# Patient Record
Sex: FEMALE | Race: WHITE | Employment: OTHER | URBAN - METROPOLITAN AREA
[De-identification: names, ages, dates, MRNs, and addresses within clinical notes are randomized per-mention and may not be internally consistent; named-entity substitution may affect disease eponyms.]

---

## 2018-11-21 PROCEDURE — 99285 EMERGENCY DEPT VISIT HI MDM: CPT | Mod: 25

## 2018-11-22 ENCOUNTER — HOSPITAL ENCOUNTER (INPATIENT)
Facility: CLINIC | Age: 83
LOS: 1 days | Discharge: HOME | DRG: 316 | End: 2018-11-23
Attending: EMERGENCY MEDICINE | Admitting: INTERNAL MEDICINE
Payer: MEDICARE

## 2018-11-22 ENCOUNTER — APPOINTMENT (OUTPATIENT)
Dept: ULTRASOUND IMAGING | Facility: CLINIC | Age: 83
DRG: 316 | End: 2018-11-22
Attending: INTERNAL MEDICINE
Payer: MEDICARE

## 2018-11-22 ENCOUNTER — APPOINTMENT (OUTPATIENT)
Dept: CARDIOLOGY | Facility: CLINIC | Age: 83
DRG: 316 | End: 2018-11-22
Attending: INTERNAL MEDICINE
Payer: MEDICARE

## 2018-11-22 ENCOUNTER — APPOINTMENT (OUTPATIENT)
Dept: CT IMAGING | Facility: CLINIC | Age: 83
DRG: 316 | End: 2018-11-22
Attending: EMERGENCY MEDICINE
Payer: MEDICARE

## 2018-11-22 DIAGNOSIS — R00.1 BRADYCARDIA: ICD-10-CM

## 2018-11-22 DIAGNOSIS — R07.81 PLEURITIC CHEST PAIN: ICD-10-CM

## 2018-11-22 DIAGNOSIS — I30.0 ACUTE IDIOPATHIC PERICARDITIS: Primary | ICD-10-CM

## 2018-11-22 DIAGNOSIS — M54.2 NECK PAIN: ICD-10-CM

## 2018-11-22 PROBLEM — I31.9 PERICARDITIS: Status: ACTIVE | Noted: 2018-11-22

## 2018-11-22 LAB
ANION GAP SERPL CALCULATED.3IONS-SCNC: 7 MMOL/L (ref 3–14)
BASOPHILS # BLD AUTO: 0.1 10E9/L (ref 0–0.2)
BASOPHILS NFR BLD AUTO: 0.7 %
BUN SERPL-MCNC: 20 MG/DL (ref 7–30)
CALCIUM SERPL-MCNC: 8.7 MG/DL (ref 8.5–10.1)
CHLORIDE SERPL-SCNC: 102 MMOL/L (ref 94–109)
CO2 SERPL-SCNC: 28 MMOL/L (ref 20–32)
CREAT SERPL-MCNC: 1.02 MG/DL (ref 0.52–1.04)
CRP SERPL-MCNC: 5 MG/L (ref 0–8)
D DIMER PPP FEU-MCNC: 0.4 UG/ML FEU (ref 0–0.5)
DIFFERENTIAL METHOD BLD: NORMAL
EOSINOPHIL # BLD AUTO: 0.2 10E9/L (ref 0–0.7)
EOSINOPHIL NFR BLD AUTO: 2.3 %
ERYTHROCYTE [DISTWIDTH] IN BLOOD BY AUTOMATED COUNT: 12.9 % (ref 10–15)
ERYTHROCYTE [SEDIMENTATION RATE] IN BLOOD BY WESTERGREN METHOD: 10 MM/H (ref 0–30)
GFR SERPL CREATININE-BSD FRML MDRD: 51 ML/MIN/1.7M2
GLUCOSE BLDC GLUCOMTR-MCNC: 117 MG/DL (ref 70–99)
GLUCOSE SERPL-MCNC: 107 MG/DL (ref 70–99)
HCT VFR BLD AUTO: 39.1 % (ref 35–47)
HGB BLD-MCNC: 13.4 G/DL (ref 11.7–15.7)
IMM GRANULOCYTES # BLD: 0 10E9/L (ref 0–0.4)
IMM GRANULOCYTES NFR BLD: 0.3 %
INTERPRETATION ECG - MUSE: NORMAL
INTERPRETATION ECG - MUSE: NORMAL
LYMPHOCYTES # BLD AUTO: 2.4 10E9/L (ref 0.8–5.3)
LYMPHOCYTES NFR BLD AUTO: 25.7 %
MAGNESIUM SERPL-MCNC: 2.1 MG/DL (ref 1.6–2.3)
MCH RBC QN AUTO: 30.3 PG (ref 26.5–33)
MCHC RBC AUTO-ENTMCNC: 34.3 G/DL (ref 31.5–36.5)
MCV RBC AUTO: 89 FL (ref 78–100)
MONOCYTES # BLD AUTO: 0.6 10E9/L (ref 0–1.3)
MONOCYTES NFR BLD AUTO: 7 %
NEUTROPHILS # BLD AUTO: 5.9 10E9/L (ref 1.6–8.3)
NEUTROPHILS NFR BLD AUTO: 64 %
NRBC # BLD AUTO: 0 10*3/UL
NRBC BLD AUTO-RTO: 0 /100
PHOSPHATE SERPL-MCNC: 3.3 MG/DL (ref 2.5–4.5)
PLATELET # BLD AUTO: 218 10E9/L (ref 150–450)
POTASSIUM SERPL-SCNC: 4 MMOL/L (ref 3.4–5.3)
RBC # BLD AUTO: 4.42 10E12/L (ref 3.8–5.2)
SODIUM SERPL-SCNC: 137 MMOL/L (ref 133–144)
TROPONIN I SERPL-MCNC: <0.015 UG/L (ref 0–0.04)
WBC # BLD AUTO: 9.2 10E9/L (ref 4–11)

## 2018-11-22 PROCEDURE — 85379 FIBRIN DEGRADATION QUANT: CPT | Performed by: EMERGENCY MEDICINE

## 2018-11-22 PROCEDURE — 80048 BASIC METABOLIC PNL TOTAL CA: CPT | Performed by: EMERGENCY MEDICINE

## 2018-11-22 PROCEDURE — A9270 NON-COVERED ITEM OR SERVICE: HCPCS | Performed by: EMERGENCY MEDICINE

## 2018-11-22 PROCEDURE — 36416 COLLJ CAPILLARY BLOOD SPEC: CPT | Performed by: INTERNAL MEDICINE

## 2018-11-22 PROCEDURE — 40000264 ECHO COMPLETE WITH OPTISON

## 2018-11-22 PROCEDURE — 84484 ASSAY OF TROPONIN QUANT: CPT | Performed by: EMERGENCY MEDICINE

## 2018-11-22 PROCEDURE — 99222 1ST HOSP IP/OBS MODERATE 55: CPT | Mod: 25 | Performed by: INTERNAL MEDICINE

## 2018-11-22 PROCEDURE — 84484 ASSAY OF TROPONIN QUANT: CPT | Performed by: INTERNAL MEDICINE

## 2018-11-22 PROCEDURE — 25000128 H RX IP 250 OP 636: Performed by: INTERNAL MEDICINE

## 2018-11-22 PROCEDURE — 99207 ZZC APP CREDIT; MD BILLING SHARED VISIT: CPT | Performed by: INTERNAL MEDICINE

## 2018-11-22 PROCEDURE — 93306 TTE W/DOPPLER COMPLETE: CPT | Mod: 26 | Performed by: INTERNAL MEDICINE

## 2018-11-22 PROCEDURE — A9270 NON-COVERED ITEM OR SERVICE: HCPCS | Performed by: INTERNAL MEDICINE

## 2018-11-22 PROCEDURE — 93005 ELECTROCARDIOGRAM TRACING: CPT | Mod: 76

## 2018-11-22 PROCEDURE — 21000000 ZZH R&B IMCU HEART CARE

## 2018-11-22 PROCEDURE — 86617 LYME DISEASE ANTIBODY: CPT | Performed by: INTERNAL MEDICINE

## 2018-11-22 PROCEDURE — 25500064 ZZH RX 255 OP 636: Performed by: INTERNAL MEDICINE

## 2018-11-22 PROCEDURE — 74177 CT ABD & PELVIS W/CONTRAST: CPT

## 2018-11-22 PROCEDURE — 36415 COLL VENOUS BLD VENIPUNCTURE: CPT | Performed by: INTERNAL MEDICINE

## 2018-11-22 PROCEDURE — 83735 ASSAY OF MAGNESIUM: CPT | Performed by: INTERNAL MEDICINE

## 2018-11-22 PROCEDURE — 93970 EXTREMITY STUDY: CPT

## 2018-11-22 PROCEDURE — 86140 C-REACTIVE PROTEIN: CPT | Performed by: EMERGENCY MEDICINE

## 2018-11-22 PROCEDURE — 96361 HYDRATE IV INFUSION ADD-ON: CPT

## 2018-11-22 PROCEDURE — 25000132 ZZH RX MED GY IP 250 OP 250 PS 637: Performed by: INTERNAL MEDICINE

## 2018-11-22 PROCEDURE — 00000146 ZZHCL STATISTIC GLUCOSE BY METER IP

## 2018-11-22 PROCEDURE — 25000128 H RX IP 250 OP 636: Performed by: EMERGENCY MEDICINE

## 2018-11-22 PROCEDURE — 85652 RBC SED RATE AUTOMATED: CPT | Performed by: EMERGENCY MEDICINE

## 2018-11-22 PROCEDURE — 85025 COMPLETE CBC W/AUTO DIFF WBC: CPT | Performed by: EMERGENCY MEDICINE

## 2018-11-22 PROCEDURE — 86618 LYME DISEASE ANTIBODY: CPT | Performed by: INTERNAL MEDICINE

## 2018-11-22 PROCEDURE — 93005 ELECTROCARDIOGRAM TRACING: CPT

## 2018-11-22 PROCEDURE — 25000125 ZZHC RX 250: Performed by: EMERGENCY MEDICINE

## 2018-11-22 PROCEDURE — 96374 THER/PROPH/DIAG INJ IV PUSH: CPT

## 2018-11-22 PROCEDURE — 84100 ASSAY OF PHOSPHORUS: CPT | Performed by: INTERNAL MEDICINE

## 2018-11-22 PROCEDURE — 96375 TX/PRO/DX INJ NEW DRUG ADDON: CPT

## 2018-11-22 PROCEDURE — 25000132 ZZH RX MED GY IP 250 OP 250 PS 637: Performed by: EMERGENCY MEDICINE

## 2018-11-22 PROCEDURE — 99223 1ST HOSP IP/OBS HIGH 75: CPT | Mod: AI | Performed by: INTERNAL MEDICINE

## 2018-11-22 RX ORDER — AMOXICILLIN 250 MG
1 CAPSULE ORAL 2 TIMES DAILY PRN
Status: DISCONTINUED | OUTPATIENT
Start: 2018-11-22 | End: 2018-11-23 | Stop reason: HOSPADM

## 2018-11-22 RX ORDER — BISACODYL 10 MG
10 SUPPOSITORY, RECTAL RECTAL DAILY PRN
Status: DISCONTINUED | OUTPATIENT
Start: 2018-11-22 | End: 2018-11-23 | Stop reason: HOSPADM

## 2018-11-22 RX ORDER — ONDANSETRON 4 MG/1
4 TABLET, ORALLY DISINTEGRATING ORAL EVERY 6 HOURS PRN
Status: DISCONTINUED | OUTPATIENT
Start: 2018-11-22 | End: 2018-11-23 | Stop reason: HOSPADM

## 2018-11-22 RX ORDER — MAGNESIUM SULFATE HEPTAHYDRATE 40 MG/ML
4 INJECTION, SOLUTION INTRAVENOUS EVERY 4 HOURS PRN
Status: DISCONTINUED | OUTPATIENT
Start: 2018-11-22 | End: 2018-11-22

## 2018-11-22 RX ORDER — MAGNESIUM SULFATE HEPTAHYDRATE 40 MG/ML
4 INJECTION, SOLUTION INTRAVENOUS EVERY 4 HOURS PRN
Status: DISCONTINUED | OUTPATIENT
Start: 2018-11-22 | End: 2018-11-23 | Stop reason: HOSPADM

## 2018-11-22 RX ORDER — POTASSIUM CHLORIDE 29.8 MG/ML
20 INJECTION INTRAVENOUS
Status: DISCONTINUED | OUTPATIENT
Start: 2018-11-22 | End: 2018-11-23 | Stop reason: HOSPADM

## 2018-11-22 RX ORDER — HYDRALAZINE HYDROCHLORIDE 20 MG/ML
10 INJECTION INTRAMUSCULAR; INTRAVENOUS EVERY 4 HOURS PRN
Status: DISCONTINUED | OUTPATIENT
Start: 2018-11-22 | End: 2018-11-23 | Stop reason: HOSPADM

## 2018-11-22 RX ORDER — ATROPINE SULFATE 0.1 MG/ML
INJECTION INTRAVENOUS
Status: DISCONTINUED
Start: 2018-11-22 | End: 2018-11-22 | Stop reason: HOSPADM

## 2018-11-22 RX ORDER — POTASSIUM CHLORIDE 29.8 MG/ML
20 INJECTION INTRAVENOUS
Status: DISCONTINUED | OUTPATIENT
Start: 2018-11-22 | End: 2018-11-22

## 2018-11-22 RX ORDER — ACETAMINOPHEN 325 MG/1
650 TABLET ORAL EVERY 4 HOURS PRN
COMMUNITY

## 2018-11-22 RX ORDER — PROCHLORPERAZINE MALEATE 5 MG
5 TABLET ORAL EVERY 6 HOURS PRN
Status: DISCONTINUED | OUTPATIENT
Start: 2018-11-22 | End: 2018-11-23 | Stop reason: HOSPADM

## 2018-11-22 RX ORDER — OXYCODONE HYDROCHLORIDE 5 MG/1
5-10 TABLET ORAL
Status: DISCONTINUED | OUTPATIENT
Start: 2018-11-22 | End: 2018-11-23 | Stop reason: HOSPADM

## 2018-11-22 RX ORDER — AMOXICILLIN 250 MG
1 CAPSULE ORAL 2 TIMES DAILY
Status: DISCONTINUED | OUTPATIENT
Start: 2018-11-22 | End: 2018-11-23 | Stop reason: HOSPADM

## 2018-11-22 RX ORDER — POLYETHYLENE GLYCOL 3350 17 G/17G
17 POWDER, FOR SOLUTION ORAL DAILY PRN
Status: DISCONTINUED | OUTPATIENT
Start: 2018-11-22 | End: 2018-11-23 | Stop reason: HOSPADM

## 2018-11-22 RX ORDER — AMLODIPINE BESYLATE 5 MG/1
5 TABLET ORAL DAILY
Status: ON HOLD | COMMUNITY
End: 2018-11-23

## 2018-11-22 RX ORDER — HYDROMORPHONE HYDROCHLORIDE 1 MG/ML
0.5 INJECTION, SOLUTION INTRAMUSCULAR; INTRAVENOUS; SUBCUTANEOUS ONCE
Status: COMPLETED | OUTPATIENT
Start: 2018-11-22 | End: 2018-11-22

## 2018-11-22 RX ORDER — POTASSIUM CHLORIDE 1500 MG/1
20-40 TABLET, EXTENDED RELEASE ORAL
Status: DISCONTINUED | OUTPATIENT
Start: 2018-11-22 | End: 2018-11-23 | Stop reason: HOSPADM

## 2018-11-22 RX ORDER — AMOXICILLIN 250 MG
2 CAPSULE ORAL 2 TIMES DAILY
Status: DISCONTINUED | OUTPATIENT
Start: 2018-11-22 | End: 2018-11-23 | Stop reason: HOSPADM

## 2018-11-22 RX ORDER — ATENOLOL 25 MG/1
25 TABLET ORAL 2 TIMES DAILY
Status: ON HOLD | COMMUNITY
End: 2018-11-23

## 2018-11-22 RX ORDER — ONDANSETRON 2 MG/ML
INJECTION INTRAMUSCULAR; INTRAVENOUS
Status: DISCONTINUED
Start: 2018-11-22 | End: 2018-11-22 | Stop reason: HOSPADM

## 2018-11-22 RX ORDER — PROCHLORPERAZINE 25 MG
12.5 SUPPOSITORY, RECTAL RECTAL EVERY 12 HOURS PRN
Status: DISCONTINUED | OUTPATIENT
Start: 2018-11-22 | End: 2018-11-23 | Stop reason: HOSPADM

## 2018-11-22 RX ORDER — COLCHICINE 0.6 MG/1
0.6 TABLET ORAL 2 TIMES DAILY
Status: COMPLETED | OUTPATIENT
Start: 2018-11-22 | End: 2018-11-23

## 2018-11-22 RX ORDER — NITROGLYCERIN 0.4 MG/1
0.4 TABLET SUBLINGUAL EVERY 5 MIN PRN
Status: DISCONTINUED | OUTPATIENT
Start: 2018-11-22 | End: 2018-11-23 | Stop reason: HOSPADM

## 2018-11-22 RX ORDER — ERGOCALCIFEROL (VITAMIN D2) 10 MCG
1 TABLET ORAL DAILY
COMMUNITY

## 2018-11-22 RX ORDER — ACETAMINOPHEN 325 MG/1
650 TABLET ORAL EVERY 4 HOURS PRN
Status: DISCONTINUED | OUTPATIENT
Start: 2018-11-22 | End: 2018-11-23 | Stop reason: HOSPADM

## 2018-11-22 RX ORDER — LIDOCAINE 40 MG/G
CREAM TOPICAL
Status: DISCONTINUED | OUTPATIENT
Start: 2018-11-22 | End: 2018-11-22

## 2018-11-22 RX ORDER — POTASSIUM CHLORIDE 1500 MG/1
20-40 TABLET, EXTENDED RELEASE ORAL
Status: DISCONTINUED | OUTPATIENT
Start: 2018-11-22 | End: 2018-11-22

## 2018-11-22 RX ORDER — COLCHICINE 0.6 MG/1
0.6 TABLET ORAL DAILY
Status: DISCONTINUED | OUTPATIENT
Start: 2018-11-24 | End: 2018-11-23 | Stop reason: HOSPADM

## 2018-11-22 RX ORDER — IOPAMIDOL 755 MG/ML
80 INJECTION, SOLUTION INTRAVASCULAR ONCE
Status: COMPLETED | OUTPATIENT
Start: 2018-11-22 | End: 2018-11-22

## 2018-11-22 RX ORDER — NALOXONE HYDROCHLORIDE 0.4 MG/ML
.1-.4 INJECTION, SOLUTION INTRAMUSCULAR; INTRAVENOUS; SUBCUTANEOUS
Status: DISCONTINUED | OUTPATIENT
Start: 2018-11-22 | End: 2018-11-23 | Stop reason: HOSPADM

## 2018-11-22 RX ORDER — POTASSIUM CHLORIDE 7.45 MG/ML
10 INJECTION INTRAVENOUS
Status: DISCONTINUED | OUTPATIENT
Start: 2018-11-22 | End: 2018-11-23 | Stop reason: HOSPADM

## 2018-11-22 RX ORDER — POTASSIUM CHLORIDE 7.45 MG/ML
10 INJECTION INTRAVENOUS
Status: DISCONTINUED | OUTPATIENT
Start: 2018-11-22 | End: 2018-11-22

## 2018-11-22 RX ORDER — LIDOCAINE 40 MG/G
CREAM TOPICAL
Status: DISCONTINUED | OUTPATIENT
Start: 2018-11-22 | End: 2018-11-23 | Stop reason: HOSPADM

## 2018-11-22 RX ORDER — HYDROMORPHONE HYDROCHLORIDE 1 MG/ML
0.2 INJECTION, SOLUTION INTRAMUSCULAR; INTRAVENOUS; SUBCUTANEOUS
Status: DISCONTINUED | OUTPATIENT
Start: 2018-11-22 | End: 2018-11-23 | Stop reason: HOSPADM

## 2018-11-22 RX ORDER — POTASSIUM CHLORIDE 1.5 G/1.58G
20-40 POWDER, FOR SOLUTION ORAL
Status: DISCONTINUED | OUTPATIENT
Start: 2018-11-22 | End: 2018-11-23 | Stop reason: HOSPADM

## 2018-11-22 RX ORDER — AMOXICILLIN 250 MG
2 CAPSULE ORAL 2 TIMES DAILY PRN
Status: DISCONTINUED | OUTPATIENT
Start: 2018-11-22 | End: 2018-11-23 | Stop reason: HOSPADM

## 2018-11-22 RX ORDER — SODIUM CHLORIDE 9 MG/ML
INJECTION, SOLUTION INTRAVENOUS CONTINUOUS
Status: DISCONTINUED | OUTPATIENT
Start: 2018-11-22 | End: 2018-11-23

## 2018-11-22 RX ORDER — POTASSIUM CL/LIDO/0.9 % NACL 10MEQ/0.1L
10 INTRAVENOUS SOLUTION, PIGGYBACK (ML) INTRAVENOUS
Status: DISCONTINUED | OUTPATIENT
Start: 2018-11-22 | End: 2018-11-22

## 2018-11-22 RX ORDER — ATROPINE SULFATE 0.1 MG/ML
0.5 INJECTION INTRAVENOUS ONCE
Status: COMPLETED | OUTPATIENT
Start: 2018-11-22 | End: 2018-11-22

## 2018-11-22 RX ORDER — ONDANSETRON 2 MG/ML
4 INJECTION INTRAMUSCULAR; INTRAVENOUS EVERY 30 MIN PRN
Status: DISCONTINUED | OUTPATIENT
Start: 2018-11-22 | End: 2018-11-22

## 2018-11-22 RX ORDER — POTASSIUM CL/LIDO/0.9 % NACL 10MEQ/0.1L
10 INTRAVENOUS SOLUTION, PIGGYBACK (ML) INTRAVENOUS
Status: DISCONTINUED | OUTPATIENT
Start: 2018-11-22 | End: 2018-11-23 | Stop reason: HOSPADM

## 2018-11-22 RX ORDER — ONDANSETRON 2 MG/ML
4 INJECTION INTRAMUSCULAR; INTRAVENOUS EVERY 6 HOURS PRN
Status: DISCONTINUED | OUTPATIENT
Start: 2018-11-22 | End: 2018-11-23 | Stop reason: HOSPADM

## 2018-11-22 RX ORDER — POTASSIUM CHLORIDE 1.5 G/1.58G
20-40 POWDER, FOR SOLUTION ORAL
Status: DISCONTINUED | OUTPATIENT
Start: 2018-11-22 | End: 2018-11-22

## 2018-11-22 RX ORDER — OXYBUTYNIN CHLORIDE 5 MG/1
5 TABLET ORAL 2 TIMES DAILY PRN
COMMUNITY

## 2018-11-22 RX ADMIN — ASPIRIN 650 MG: 325 TABLET, DELAYED RELEASE ORAL at 08:24

## 2018-11-22 RX ADMIN — SENNOSIDES AND DOCUSATE SODIUM 1 TABLET: 8.6; 5 TABLET ORAL at 08:24

## 2018-11-22 RX ADMIN — SODIUM CHLORIDE: 9 INJECTION, SOLUTION INTRAVENOUS at 04:45

## 2018-11-22 RX ADMIN — ASPIRIN 650 MG: 325 TABLET, DELAYED RELEASE ORAL at 16:44

## 2018-11-22 RX ADMIN — HUMAN ALBUMIN MICROSPHERES AND PERFLUTREN 9 ML: 10; .22 INJECTION, SOLUTION INTRAVENOUS at 16:38

## 2018-11-22 RX ADMIN — ACETAMINOPHEN 650 MG: 325 TABLET, FILM COATED ORAL at 10:49

## 2018-11-22 RX ADMIN — SODIUM CHLORIDE 1000 ML: 9 INJECTION, SOLUTION INTRAVENOUS at 00:59

## 2018-11-22 RX ADMIN — ASPIRIN 650 MG: 325 TABLET, DELAYED RELEASE ORAL at 22:32

## 2018-11-22 RX ADMIN — LIDOCAINE HYDROCHLORIDE 30 ML: 20 SOLUTION ORAL; TOPICAL at 00:12

## 2018-11-22 RX ADMIN — ACETAMINOPHEN 650 MG: 325 TABLET, FILM COATED ORAL at 18:43

## 2018-11-22 RX ADMIN — ONDANSETRON 4 MG: 2 INJECTION INTRAMUSCULAR; INTRAVENOUS at 01:00

## 2018-11-22 RX ADMIN — Medication 0.5 MG: at 02:04

## 2018-11-22 RX ADMIN — SODIUM CHLORIDE: 9 INJECTION, SOLUTION INTRAVENOUS at 15:20

## 2018-11-22 RX ADMIN — IOPAMIDOL 80 ML: 755 INJECTION, SOLUTION INTRAVENOUS at 01:29

## 2018-11-22 RX ADMIN — ATROPINE SULFATE 0.5 MG: 0.1 INJECTION, SOLUTION INTRAVENOUS at 01:00

## 2018-11-22 RX ADMIN — COLCHICINE 0.6 MG: 0.6 TABLET, FILM COATED ORAL at 20:50

## 2018-11-22 RX ADMIN — Medication 0.2 MG: at 04:59

## 2018-11-22 ASSESSMENT — ACTIVITIES OF DAILY LIVING (ADL)
ADLS_ACUITY_SCORE: 9

## 2018-11-22 ASSESSMENT — ENCOUNTER SYMPTOMS: NECK PAIN: 1

## 2018-11-22 ASSESSMENT — PAIN DESCRIPTION - DESCRIPTORS: DESCRIPTORS: SORE

## 2018-11-22 NOTE — IP AVS SNAPSHOT
MRN:[id number]                      After Visit Summary   11/22/2018    Collette Sanchez    MRN: [id number]           Thank you!     Thank you for choosing [facility] for your care. Our goal is always to provide you with excellent care. Hearing back from our patients is one way we can continue to improve our services. Please take a few minutes to complete the written survey that you may receive in the mail after you visit with us. Thank you!        Patient Information     Date Of Birth          1/10/1931        Designated Caregiver       Most Recent Value    Caregiver    Will someone help with your care after discharge? no      About your hospital stay     You were admitted on:  November 22, 2018 You last received care in the:  [facility]    You were discharged on:  November 23, 2018        Reason for your hospital stay       Acute pericarditis                  Who to Call     For medical emergencies, please call 911.  For non-urgent questions about your medical care, please call your primary care provider or clinic, [phone]          Attending Provider     Provider Specialty    Trierweiler, Chad A, MD Emergency Medicine    Swift, Dmtiriy Stark MD Internal Medicine       Primary Care Provider Office Phone # Fax #    Carmen Rivas [phone]      After Care Instructions     Activity       Your activity upon discharge: activity as tolerated            Diet       Follow this diet upon discharge: Orders Placed This Encounter      Room Service      Regular Diet Adult            Monitor and record       blood pressure daily and readings to PCP/Cardiology for any medication adjustment   pulse daily                  Follow-up Appointments     Follow-up and recommended labs and tests        Follow up with primary care provider, Carmen Rivas, within 7 days for hospital follow- up.  The following labs/tests are recommended: CBC/BMP.  Abdominal ultrasound through  "PCP to evaluate for  Questionable nodularity in the liver   Follow-up with Cardiology [facility] as scheduled                  Additional Services     Follow-Up with Cardiac Advanced Practice Provider                 Further instructions from your care team         Follow-up in 1 month with [facility] in cardiology clinic continue aspirin and colchicine in the meantime.      Pending Results     Date and Time Order Name Status Description    2018 0545 Lyme Conf IgG and IgM by Immunoblot In process             Statement of Approval     Ordered          18 1310  I have reviewed and agree with all the recommendations and orders detailed in this document.  EFFECTIVE NOW     Approved and electronically signed by:  Anne Cee MD             Admission Information     Date & Time Provider Department Dept. Phone    2018 Swift, Dmitriy Stark MD [facility] [phone]      Your Vitals Were     Blood Pressure Pulse Temperature Respirations Height Weight    138/72 (BP Location: Left arm) 75 98.5  F (36.9  C) (Oral) 18 1.549 m (5' 1\") 71.8 kg (158 lb 6.4 oz)    Pulse Oximetry BMI (Body Mass Index)                94% 29.93 kg/m2          [employer] Information     [employer] lets you send messages to your doctor, view your test results, renew your prescriptions, schedule appointments and more. To sign up, go to [email or web address] . Click on \"Log in\" on the left side of the screen, which will take you to the Welcome page. Then click on \"Sign up Now\" on the right side of the page.     You will be asked to enter the access code listed below, as well as some personal information. Please follow the directions to create your username and password.     Your access code is: [email or web address]  Expires: 2019  1:10 PM     Your access code will  in 90 days. If you need help or a new code, please call your [facility] or [phone].        Care EveryWhere ID     This is your Care EveryWhere ID. This " could be used by other organizations to access your [facility] medical records  [id number]        Equal Access to Services     VILMA LOCKHART : Hadii susan Ureña, xi mae, qamigueltj kacristiraquel luna, eduardo yeungabbynavi kurtz. So [facility] [phone].    ATENCIÓN: Si habla español, tiene a ford disposición servicios gratuitos de asistencia lingüística. Llame al [phone].    We comply with applicable federal civil rights laws and [place] laws. We do not discriminate on the basis of race, color, national origin, age, disability, sex, sexual orientation, or gender identity.               Review of your medicines      START taking        Dose / Directions    aspirin 325 MG EC tablet   Commonly known as:  ASA        Dose:  650 mg   Take 2 tablets (650 mg) by mouth 3 times daily   Quantity:  180 tablet   Refills:  0       colchicine 0.6 MG tablet   Commonly known as:  COLCYRS        Dose:  0.6 mg   Start taking on:  11/24/2018   Take 1 tablet (0.6 mg) by mouth daily   Quantity:  30 tablet   Refills:  0       omeprazole 40 MG capsule   Commonly known as:  priLOSEC        Dose:  40 mg   Start taking on:  11/24/2018   Take 1 capsule (40 mg) by mouth every morning (before breakfast)   Quantity:  30 capsule   Refills:  0         CONTINUE these medicines which have NOT CHANGED        Dose / Directions    acetaminophen 325 MG tablet   Commonly known as:  TYLENOL        Dose:  650 mg   Take 650 mg by mouth every 4 hours as needed for mild pain   Refills:  0       calcium carbonate 600 mg (elemental) 1500 (600 Ca) MG tablet   Commonly known as:  OS-BRIANA        Dose:  1200 mg   Take 1,200 mg by mouth daily   Refills:  0       oxybutynin 5 MG tablet   Commonly known as:  DITROPAN        Dose:  5 mg   Take 5 mg by mouth 2 times daily as needed for bladder spasms   Refills:  0       Vitamin D (Cholecalciferol) 400 units Tabs        Dose:  1 tablet   Take 1 tablet by mouth daily   Refills:  0          STOP taking     amLODIPine 5 MG tablet   Commonly known as:  NORVASC           atenolol 25 MG tablet   Commonly known as:  TENORMIN                Where to get your medicines      These medications were sent to [facility] [place], [place] - [address]  [address] [place] [address]     Phone:  [phone]     aspirin 325 MG EC tablet    colchicine 0.6 MG tablet    omeprazole 40 MG capsule                Protect others around you: Learn how to safely use, store and throw away your medicines at www.disposemymeds.org.             Medication List: This is a list of all your medications and when to take them. Check marks below indicate your daily home schedule. Keep this list as a reference.      Medications           Morning Afternoon Evening Bedtime As Needed    acetaminophen 325 MG tablet   Commonly known as:  TYLENOL   Take 650 mg by mouth every 4 hours as needed for mild pain   Last time this was given:  650 mg on 11/22/2018  6:43 PM                                aspirin 325 MG EC tablet   Commonly known as:  ASA   Take 2 tablets (650 mg) by mouth 3 times daily   Last time this was given:  650 mg on 11/23/2018  8:55 AM                                calcium carbonate 600 mg (elemental) 1500 (600 Ca) MG tablet   Commonly known as:  OS-BRIANA   Take 1,200 mg by mouth daily                                colchicine 0.6 MG tablet   Commonly known as:  COLCYRS   Take 1 tablet (0.6 mg) by mouth daily   Start taking on:  11/24/2018   Last time this was given:  0.6 mg on 11/23/2018  8:55 AM                                omeprazole 40 MG capsule   Commonly known as:  priLOSEC   Take 1 capsule (40 mg) by mouth every morning (before breakfast)   Start taking on:  11/24/2018   Last time this was given:  20 mg on 11/23/2018  8:54 AM                                oxybutynin 5 MG tablet   Commonly known as:  DITROPAN   Take 5 mg by mouth 2 times daily as needed for bladder spasms                                 Vitamin D (Cholecalciferol) 400 units Tabs   Take 1 tablet by mouth daily                                          More Information        Pericarditis    Pericarditis is inflammation of the pericardium, the thin sac (membrane) that surrounds the heart.  The pericardium holds the heart in place and helps it work properly. There is a small amount of fluid between the inner and outer layers of the pericardium. This fluid keeps the layers from rubbing as the heart moves to pump blood. Pericarditis may last for 2 to 6 weeks.  Home care  Follow these guidelines when caring for yourself at home:    It s important to rest until you feel better. Don t do any strenuous activity during this time.    You may use ibuprofen or naproxen to control pain, unless another medicine was prescribed. If you have chronic liver or kidney disease, talk with your healthcare provider before using these medicines. Also talk with your provider if you ve had a stomach ulcer or gastrointestinal bleeding. Acetaminophen may not help this type of pain as much as ibuprofen or naproxen.  Follow-up care  Follow up with your healthcare provider, or as advised. Also call your provider if your symptoms don t get better in 1 week, or if they last more than 2 weeks.  When to seek medical advice  Call your healthcare provider right away if any of these occur:    A change in the type of pain. This means if it feels different, becomes more severe, lasts longer, or begins to spread into your shoulder, arm, neck, jaw, or back.    Shortness of breath or more pain with breathing    Weakness, dizziness, or fainting    Cough with dark-colored phlegm (sputum) or blood    Fever of 100.4 F (38 C) or higher, or as directed by your healthcare provider    Swelling in a leg    Rapid pulse rate that does not go away with rest  Date Last Reviewed: 6/1/2016 2000-2018 [employer] [address] [facility], [place] [address]. All rights reserved.  This information is not intended as a substitute for professional medical care. Always follow your healthcare professional's instructions.                Patient Education    Colchicine Oral capsule    Colchicine Oral tablet    Colchicine Solution for injection  Colchicine Oral tablet  What is this medicine?  COLCHICINE (KOL chi seen) is for joint pain and swelling due to attacks of acute gouty arthritis. The medicine is also used to treat familial Mediterranean fever.  This medicine may be used for other purposes; ask your health care provider or pharmacist if you have questions.  What should I tell my health care provider before I take this medicine?  They need to know if you have any of these conditions:    anemia    blood disorders like leukemia or lymphoma    heart disease    immune system problems    intestinal disease    kidney disease    liver disease    muscle pain or weakness    take other medicines    stomach problems    an unusual or allergic reaction to colchicine, other medicines, lactose, foods, dyes, or preservatives    pregnant or trying to get pregnant    breast-feeding  How should I use this medicine?  Take this medicine by mouth with a full glass of water. Follow the directions on the prescription label. You can take it with or without food. If it upsets your stomach, take it with food. Take your medicine at regular intervals. Do not take your medicine more often than directed.  A special MedGuide will be given to you by the pharmacist with each prescription and refill. Be sure to read this information carefully each time.  Talk to your pediatrician regarding the use of this medicine in children. While this drug may be prescribed for children as young as 4 years old for selected conditions, precautions do apply.  Patients over 65 years old may have a stronger reaction and need a smaller dose.  Overdosage: If you think you have taken too much of this medicine contact a poison control center or  emergency room at once.  NOTE: This medicine is only for you. Do not share this medicine with others.  What if I miss a dose?  If you miss a dose, take it as soon as you can. If it is almost time for your next dose, take only that dose. Do not take double or extra doses.  What may interact with this medicine?  Do not take this medicine with any of the following medications:    certain medicines for fungal infections like itraconazole  This medicine may also interact with the following medications:    alcohol    certain medicines for cholesterol like atorvastatin    certain medicines for coughs and colds    certain medicines to help you breathe better    cyclosporine    digoxin    epinephrine    grapefruit or grapefruit juice    methenamine    other medicines for fungal infection    sodium bicarbonate    some antibiotics like clarithromycin, erythromycin, and telithromycin    some medicines for an irregular heartbeat or other heart problems    some medicines for cancer, like lapatinib and tamoxifen    some medicines for HIV  This list may not describe all possible interactions. Give your health care provider a list of all the medicines, herbs, non-prescription drugs, or dietary supplements you use. Also tell them if you smoke, drink alcohol, or use illegal drugs. Some items may interact with your medicine.  What should I watch for while using this medicine?  Visit your doctor or health care professional for regular checks on your progress. You may need periodic blood checks.  Alcohol can increase the chance of getting stomach problems and gout attacks. Do not drink alcohol.  What side effects may I notice from receiving this medicine?  Side effects that you should report to your doctor or health care professional as soon as possible:    allergic reactions like skin rash, itching or hives, swelling of the face, lips, or tongue    fever, chills, or sore throat    muscle tenderness, pain, or weakness    numbness or  tingling in hands or feet    unusual bleeding or bruising    unusually weak or tired    vomiting  Side effects that usually do not require medical attention (report to your doctor or health care professional if they continue or are bothersome):    diarrhea    hair loss    loss of appetite    stomach pain or nausea  This list may not describe all possible side effects. Call your doctor for medical advice about side effects. You may report side effects to FDA at 8-595-FLL-0147.  Where should I keep my medicine?  Keep out of the reach of children.  Store at room temperature between 15 and 30 degrees C (59 and 86 degrees F). Keep container tightly closed. Protect from light. Throw away any unused medicine after the expiration date.  NOTE:This sheet is a summary. It may not cover all possible information. If you have questions about this medicine, talk to your doctor, pharmacist, or health care provider. Copyright  2016 [employer]                Omeprazole tablets (OTC)  Brand Name: Prilosec OTC  What is this medicine?  OMEPRAZOLE (oh ME pray zol) prevents the production of acid in the stomach. It is used to treat the symptoms of heartburn. You can buy this medicine without a prescription. This product is not for long-term use, unless otherwise directed by your doctor or health care professional.  How should I use this medicine?  Take this medicine by mouth. Follow the directions on the product label. If you are taking this medicine without a prescription, take one tablet every day. Do not use for longer than 14 days or repeat a course of treatment more often than every 4 months unless directed by a doctor or healthcare professional. Take your dose at regular intervals every 24 hours. Swallow the tablet whole with a drink of water. Do not crush, break or chew. This medicine works best if taken on an empty stomach 30 minutes before breakfast. If you are using this medicine with the prescription of your doctor or  healthcare professional, follow the directions you were given. Do not take your medicine more often than directed.  Talk to your pediatrician regarding the use of this medicine in children. Special care may be needed.  What side effects may I notice from receiving this medicine?  Side effects that you should report to your doctor or health care professional as soon as possible:    allergic reactions like skin rash, itching or hives, swelling of the face, lips, or tongue    bone, muscle or joint pain    breathing problems    chest pain or chest tightness    dark yellow or brown urine    diarrhea    dizziness    fast, irregular heartbeat    feeling faint or lightheaded    fever or sore throat    muscle spasm    palpitations    rash on cheeks or arms that gets worse in the sun    redness, blistering, peeling or loosening of the skin, including inside the mouth    seizures    stomach polyps    tremors    unusual bleeding or bruising    unusually weak or tired    yellowing of the eyes or skin  Side effects that usually do not require medical attention (report to your doctor or health care professional if they continue or are bothersome):    constipation    dry mouth    headache    loose stools    nausea  What may interact with this medicine?  Do not take this medicine with any of the following medications:    atazanavir    clopidogrel    nelfinavir  This medicine may also interact with the following medications:    ampicillin    certain medicines for anxiety or sleep    certain medicines that treat or prevent blood clots like warfarin    cyclosporine    diazepam    digoxin    disulfiram    iron salts    methotrexate    mycophenolate mofetil    phenytoin    prescription medicine for fungal or yeast infection like itraconazole, ketoconazole, voriconazole    saquinavir    tacrolimus  What if I miss a dose?  If you miss a dose, take it as soon as you can. If it is almost time for your next dose, take only that dose. Do not  take double or extra doses.  Where should I keep my medicine?  Keep out of the reach of children.  Store at room temperature between 20 and 25 degrees C (68 and 77 degrees F). Protect from light and moisture. Throw away any unused medicine after the expiration date.  What should I tell my health care provider before I take this medicine?  They need to know if you have any of these conditions:    black or bloody stools    chest pain    difficulty swallowing    have had heartburn for over 3 months    have heartburn with dizziness, lightheadedness or sweating    liver disease    lupus    stomach pain    unexplained weight loss    vomiting with blood    wheezing    an unusual or allergic reaction to omeprazole, other medicines, foods, dyes, or preservatives    pregnant or trying to get pregnant    breast-feeding  What should I watch for while using this medicine?  It can take several days before your heartburn gets better. Check with your doctor or health care professional if your condition does not start to get better, or if it gets worse.  Do not treat diarrhea with over the counter products. Contact your doctor if you have diarrhea that lasts more than 2 days or if it is severe and watery.  Do not treat yourself for heartburn with this medicine for more than 14 days in a row. You should only use this medicine for a 2-week treatment period once every 4 months. If your symptoms return shortly after your therapy is complete, or within the 4 month time frame, call your doctor or health care professional.  NOTE:This sheet is a summary. It may not cover all possible information. If you have questions about this medicine, talk to your doctor, pharmacist, or health care provider. Copyright  2018 [employer]

## 2018-11-22 NOTE — H&P
[facility]    History and Physical  Hospitalist       Date of Admission:  11/22/2018    Assessment & Plan   Collette Sanchez is a 87 year old female who presents with chest pain worsened with deep inspiration, position changes.  Radiating into her neck.    Bradycardia with hypotension: Suspect vasovagal related to pain, though with significant pericardial rub and history of Lyme disease, question if there may be some degree of perimyocarditis precipitating bradycardic episode.  As below, significant rub limits isolation of valvular heart sounds, though may certainly have some degree of aortic stenosis.  Would be unusual that this would have developed so rapidly following reportedly normal 2016 TTE.  Transient bradycardia and hypotension seems less likely to be related to flail valve.  -Prior to admission atenolol 25 mg twice daily discontinued.  This was discussed with patient and family.  -Cardiology consulted.  Patient with severe bradycardia and associated hypotension.  Unclear etiology, potentially vasovagal related to pain.  Given severity of event, monitoring in ICU (CICU overflow).  -Atropine available if needed for recurrent event  -Completing troponin trend  -TTE pending  -Electrolyte replacement protocols in place  -Lyme IgM/IgG.  Patient does have a history of Lyme disease with treatment of doxycycline approximately 3 years ago.  Does not believe she has had recurrence of Lyme since.     Pericarditis, left-sided pleuritis: Patient with significant cardiac and pleural rub.  Pleural rub is notable throughout the left lung field, pericardial rub across precordium.  Patient may have some component of diastolic murmur which I am unable to isolate secondary to pronounced rub.  Some pericardial thickening noted on CT aortogram.  No recent upper respiratory illness.  Note that patient has a history of chest pain on review of outside records, believes that she had similar chest discomfort in 2010  precipitating coronary angiography.  Additionally, patient believes that she had an episode of pleurisy more than 25 years ago.  Was not treated with steroids at that time, though does not recall what her treatment was.  History of breast cancer with right mastectomy, though did not receive chemotherapy or radiation.  -Aspirin enteric-coated 650 mg 3 times daily initiated  -TTE pending  -Troponin trending to rule out myocarditis  -Cardiology consulted as above.    -Bilateral lower extremity ultrasounds.  Rule out DVT.  Patient with chronic left lower extremity swelling which she believes is unchanged, though also tenderness to palpation of left lower cavity.  No note of PE on CT aortogram, though not dedicated PE study.  -CRP, ESR added ordered, and surprisingly within normal limits   -D-dimer added on    Hypertension: Patient reports taking atenolol 25 mg twice daily and amlodipine 5 mg daily  -Atenolol, amlodipine held.  Recommend discontinuation of atenolol as above  -As needed hydralazine available For hypertension    DVT Prophylaxis: Pneumatic Compression Devices    Code Status: Full Code    Disposition: Expected discharge in likely 3 days..    [facility]    Primary Care Physician   Carmen Rivas    Chief Complaint   Chest pain.     History is obtained from the patient, chart review, patient's son and daughter-in-law at bedside.  Discussed with Dr. Trierweiler in the emergency department, Dr. Perla in the ICU.  I also briefly outlined the case for Dr. Coon as he is on-call today for interventional cardiology (if pt has recurrent episodes of decompensation and needs temporary pacer).  Outside records reviewed including 2010 coronary angiogram with reportedly normal coronaries, patient with a TTE in 2016 of which I cannot review the results directly, though clinic communication notes a normal echocardiogram.     History of Present Illness   Collette Sanchez is a 87 year old female who presents with sudden  "onset of pleuritic chest discomfort.  Patient lives independently in [place], [place].  Has a history of hypertension as well as breast cancer status post right mastectomy.  She reports that her only medications are atenolol, amlodipine, both at low doses, as needed oxybutynin for travel.  Was recently on prophylactic aspirin 81 mg daily, though this was discontinued by her primary care provider given recent data of risk of bleeding as compared to benefit in the elderly.    Patient is able to provide the majority of her history, though note that there are some lapses in her recollection.  For instance, patient cannot recall why she had an echocardiogram 2 years ago.  Similarly, cannot recall why she had a stress test and coronary angiogram in 2010.  Later, patient can recall that she had significant discomfort and felt as though she was \"going to die\" during her stress test.  Remembers this vividly.  Cannot recall what results were reported to her for angiography or stress test, though states that she was discharged home on all of these evaluations.  Initially tells me that she has never had chest pain before, though later recalls having chest pain during her stress test similar to that which she is currently experiencing.  Patient also later recalls being diagnosed with pleurisy or pleuritis more than 25 years ago with similar chest discomfort as she is experiencing now.    Patient was doing well, took her oxybutynin prior to traveling with family from [place] to the [facility] with her family.  Onset of chest pain at 2130 which awoke her from sleep.  Reports central pain chest radiating to her back.  Worsened with deep inspiration, also worsened with positional changes such as sitting up or laying down.  Patient reports some pain in her posterior neck, though this is not reproducible on palpation and patient believes that this pain may actually be secondary to her movements.  Family brought her the emergency " department for evaluation where she had normal vital signs, and negative initial troponin.  Received a GI cocktail and no other medications.  Shortly after this in the emergency department, however, patient went from a blood pressure of 150-130 systolic, developed gradual sinus bradycardia with heart rate in the 30s range, developed hypotension to 55 systolic.  Patient became diaphoretic, clammy, near syncopal.  This lasted several minutes per report of ED physician.  Atropine was given with improvement in heart rate into the 70s and patient back to baseline with chest pain.  Patient underwent CT aortogram which was negative, though did note some pericardial thickening.  Patient remained stable in the emergency department     Patient with chronic left lower extremity swelling which she states is unchanged.  Wears a compression stocking for this.  This is noted in prior PCP notes as well. patient with no prior history of blood clots    Patient with a history of murmur for which she was evaluated with echocardiogram in 2016.  Unable to review these results through care everywhere, though clinic communication seems to indicate that this was a normal study.  Patient was evaluated by her primary care provider 11/13, though no mention of positive or negative murmur at that time.  In May 2018, PCP note describes a soft systolic murmur.  Currently, patient with quite notable rub throughout precordium, louder than I have appreciated on other patients with pericarditis, potentially masking a diastolic murmur.    History of chest pain for which he underwent coronary angiogram in 2010 with essentially normal coronary arteries reported in a note.  Patient was experiencing chest pain at that time.  Also underwent an echocardiogram with stress test, though this was nearly 20 years ago.    Patient reports a history of pleurisy 25-30 years ago diagnosed by her primary care physician before his death.  Does not recall receiving  steroids for this, is uncertain if she was treated with medications at that time.    Lengthy discussion with patient and family regarding presentation.  Most consistent with pericarditis and pleuritis, though no clear proximal cause.  No recent upper respiratory illness.  Patient does, however, have a history of pleurisy, and may have recurrent pleuritis.  Note also that patient with a history of Lyme disease with Spring tick exposure every year while gardening.  Last treated with doxycycline approximately 3 years ago, states that she has not had a similar rash or concern for Lyme disease since her last treatment.  No TB risk factors identified.    Exam is somewhat concerning given this is the most significant pleural rub and pericardial rub I have appreciated in the setting of patient's bradycardia and near bradycardic arrest in the emergency department.    Past Medical History    I have reviewed this patient's medical history and updated it with pertinent information if needed.   Vertigo  Dysphasia history  Right breast cancer status post mastectomy  Essential hypertension  Osteoarthritis  Lumbar radiculopathy    Past Surgical History   I have reviewed this patient's surgical history and updated it with pertinent information if needed.  History of R mastectomy for breast cancer   Stapectomy 1967 in   Cataract removal    Prior to Admission Medications   Oxybutynin as needed  Atenolol  25 mg   Twice daily     Allergies   Allergies   Allergen Reactions     Cephalosporins        Social History   I have reviewed this patient's social history and updated it with pertinent information if needed. Collette Sanchez  reports that she has never smoked. She has never used smokeless tobacco. She reports that she does not drink alcohol or use illicit drugs.     Family History   I have reviewed this patient's family history and updated it with pertinent information if needed.   Father  in his 90s  Mother lived to age  101, had a history of breast cancer    Review of Systems   The 10 point Review of Systems is negative other than noted in the HPI or here.   No fevers, no chills  No preceding viral illness in the prior weeks  No palpitations or racing heartbeats  No prior episodes of similar presyncope as noted in the emergency department    Physical Exam   Temp: 98.4  F (36.9  C) Temp src: Temporal BP: 115/55 Pulse: 75 Heart Rate: 71 Resp: 16 SpO2: 94 % O2 Device: None (Room air)    Vital Signs with Ranges  Temp:  [98.4  F (36.9  C)] 98.4  F (36.9  C)  Pulse:  [75] 75  Heart Rate:  [33-88] 71  Resp:  [16] 16  BP: ()/(37-73) 115/55  SpO2:  [93 %-99 %] 94 %  155 lbs 0 oz    Constitutional: no acute distress, alert, conversant.  Patient is pallorous, almost gray appearing.  Per son, this is baseline.  Eyes: no scleral icterus or injection  HEENT: moist mucous membranes  Respiratory: breath sounds clear on right, left lung field with pronounced pleural rub throughout.  Quite loud.  Cardiovascular: regular rate and rhythm.  Patient with a loud pericardial rub which is triphasic, appreciated throughout precordium.  This is actually loud enough that it may be masking a component of diastolic murmur.  It is difficult for me to tease this out.  I also appreciate what I believe to be a 2/6 harsh systolic murmur, though again, may be an over call in the setting of pericardial rub.  GI: abdomen soft, non-tender, normoactive bowel sounds, no masses  Lymph/Hematologic: 2+ left lower extremity edema, right lower extremity with trace edema.  Genitourinary: not examined  Skin: no rashes  Musculoskeletal: Diffuse muscular wasting associated with advanced age, most notable in extremities,  prior right mastectomy  Neurologic: mental status grossly intact, no focal deficits, alert  Psychiatric: normal affect    Data   Data reviewed today:  I personally reviewed EKG and rhythm strips.  Rhythm strips during arrest notes sinus  bradycardia.    Recent Labs  Lab 11/22/18  0020   WBC 9.2   HGB 13.4   MCV 89         POTASSIUM 4.0   CHLORIDE 102   CO2 28   BUN 20   CR 1.02   ANIONGAP 7   BRIANA 8.7   *   TROPI <0.015       Recent Results (from the past 24 hour(s))   CT Aortic Survey w Contrast    Narrative    CT AORTIC SURVEY W CONTRAST  11/22/2018 1:40 AM     HISTORY: Chest pain/neck pain.    TECHNIQUE: Volumetric acquisition through chest, abdomen and pelvis  with IV contrast. Additional precontrast images through the chest. 80  mL Isovue-370. Radiation dose for this scan was reduced using  automated exposure control, adjustment of the mA and/or kV according  to patient size, or iterative reconstruction technique.    COMPARISON: None.    FINDINGS: Precontrast images demonstrate some atheromatous  calcification of the thoracic aorta and coronary artery  calcifications. Following contrast there is mild atheromatous change  of the thoracic aorta without aneurysm or dissection. Trace  pericardial thickening or fluid. No pathologic lymph node enlargement  in the chest. Mild dependent atelectasis. No consolidative  infiltrates, pleural effusions or pneumothorax.    Abdomen and pelvis: Mildly atheromatous abdominal aorta without  aneurysm or dissection. Celiac axis, SMA, and renal arteries are  patent. Mild narrowing of the proximal celiac artery. The liver,  gallbladder, spleen, pancreas, adrenal glands and kidneys demonstrate  no worrisome findings. Left renal cyst. No hydronephrosis.    The uterus is present. No suspicious pelvic masses. Diffuse colonic  diverticulosis without convincing diverticulitis. No bowel obstruction  or ascites. Degenerative changes throughout the visualized spine with  multilevel degenerative disc disease in the lumbar spine.      Impression    IMPRESSION:  1. Mildly atheromatous aorta without aneurysm or dissection.  2. No other acute findings.  3. Trace pericardial thickening or fluid.  4. Colonic  diverticulosis.    KARMA ZIEGLER MD

## 2018-11-22 NOTE — PHARMACY-ADMISSION MEDICATION HISTORY
Admission medication history interview status for the 11/22/2018  admission is complete. See EPIC admission navigator for prior to admission medications     Medication history source reliability:Good    Actions taken by pharmacist (provider contacted, etc):Reviewed med list from [facility] [place] in CareEverywhere. Interviewed patient. She is knowledgeable of her meds.     Additional medication history information not noted on PTA med list :  Added all meds to list.   Oxybutynin is only PRN  ASA 81mg daily was stopped by provider about a week ago    Medication reconciliation/reorder completed by provider prior to medication history? No    Time spent in this activity: 15 minutes    Prior to Admission medications    Medication Sig Last Dose Taking? Auth Provider   acetaminophen (TYLENOL) 325 MG tablet Take 650 mg by mouth every 4 hours as needed for mild pain prn at prn Yes Unknown, Entered By History   amLODIPine (NORVASC) 5 MG tablet Take 5 mg by mouth daily 11/21/2018 at Unknown time Yes Unknown, Entered By History   atenolol (TENORMIN) 25 MG tablet Take 25 mg by mouth 2 times daily 11/21/2018 at Unknown time Yes Unknown, Entered By History   calcium carbonate 600 mg, elemental, (OS-BRIANA) 1500 (600 Ca) MG tablet Take 1,200 mg by mouth daily 11/22/2018 at Unknown time Yes Unknown, Entered By History   oxybutynin (DITROPAN) 5 MG tablet Take 5 mg by mouth 2 times daily as needed for bladder spasms prn at prn Yes Unknown, Entered By History   Vitamin D, Cholecalciferol, 400 units TABS Take 1 tablet by mouth daily 11/21/2018 at Unknown time Yes Unknown, Entered By History

## 2018-11-22 NOTE — PLAN OF CARE
Problem: Patient Care Overview  Goal: Plan of Care/Patient Progress Review  Outcome: No Change  N: A&O x 4, PERRL. Up to bathroom with stand-by assist. Prn dilaudid given for pleuritic chest pain.  CV: Tele SR. BP WNL.  Pulm: Lung sounds clear, diminished in the bases. O2 sats > 90% on RA.  GI/: Voiding. Tolerating ice chips. BG < 140.    Pt admitted from ED this am; son and daughter-in-law updated on plan of care. Plan for LE US and echocardiogram today. Will continue to monitor closely.

## 2018-11-22 NOTE — PROGRESS NOTES
Pt seen and examined. Seen by my colleague earlier in the morning. H/P, labs, vital signs and orders reviewed. Agree with his A/P.  Patient presenting with chest pain worse with positional change and deep inspiration.  In the ED became nauseous and bradycardic with a heart rate in 30s associated with hypotension, which improved with a troponin.  PTA atenolol and amlodipine has been held and was started on scheduled aspirin  -Patient currently reports her pain is better  On Exam  Chest clear to auscultate  Cardiovascular significant pericardial rub, 2+ systolic murmur    Lower extremity ultrasound negative for any DVT, echo pending cardiology consult pending    Reassess in AM

## 2018-11-22 NOTE — PROGRESS NOTES
RECEIVING UNIT ED HANDOFF REVIEW    ED Nurse Handoff Report was reviewed by: Little Daniels on November 22, 2018 at 2:37 AM

## 2018-11-22 NOTE — IP AVS SNAPSHOT
[facility]    [address]    [facility] [address]    Phone:  [phone]                                       After Visit Summary   11/22/2018    Collette Sanchez    MRN: [id number]           After Visit Summary Signature Page     I have received my discharge instructions, and my questions have been answered. I have discussed any challenges I see with this plan with the nurse or doctor.    ..........................................................................................................................................  Patient/Patient Representative Signature      ..........................................................................................................................................  Patient Representative Print Name and Relationship to Patient    ..................................................               ................................................  Date                                   Time    ..........................................................................................................................................  Reviewed by Signature/Title    ...................................................              ..............................................  Date                                               Time          22EPIC Rev 08/18

## 2018-11-22 NOTE — ED PROVIDER NOTES
History     Chief Complaint:  Chest Pain    HPI   Collette Sanchez is a 87 year old female who presents to the emergency department today for evaluation of chest pain. The patient reports that she traveled up to the [facility] to visit family today around 1230 from [place] via car. She felt otherwise well and went to bed around 2130 asymptomatic, however she notes that she woke around 2300 with posterior neck and an achy, centralized chest pain with radiation into her back with breathing and talking. She explains that her pain subsides when sitting still and holding her breath. The patient reports that her day was normal aside from carrying a cat litter box from her garage into her house and eating to meals consisting of fast food. She denies any history of similar episodes. Of note, the patient had an annual check up one week ago, at which time all labs and imaging were normal.     Allergies:  Cephalosporins     Allergies:  No Known Drug Allergies    Medications:    Medications reviewed. No current medications.     Past Medical History:    Medical history reviewed. No pertinent medical history.  Denies history of neck pain.     Past Surgical History:    Surgical history reviewed. No pertinent surgical history.    Family History:    Family history reviewed. No pertinent family history.     Social History:  The patient was accompanied to the ED by her son and daughter in law.  Smoking Status: Never Smoker  Smokeless Tobacco: Never Used  Alcohol Use: Negative     Review of Systems   Cardiovascular: Positive for chest pain.   Musculoskeletal: Positive for neck pain.   All other systems reviewed and are negative.    Physical Exam     Patient Vitals for the past 24 hrs:   BP Temp Temp src Pulse Heart Rate Resp SpO2 Height Weight   11/22/18 0416 115/55 - - - - - - - -   11/22/18 0345 109/61 - - - - - 94 % - -   11/22/18 0330 106/52 - - - - - 99 % - -   11/22/18 0315 106/53 - - - - - 98 % - -   11/22/18 0300 103/57 - - - - -  "98 % - -   11/22/18 0247 100/52 - - 75 - - - - -   11/22/18 0245 100/52 - - - 71 - 96 % - -   11/22/18 0230 100/54 - - - 74 - 96 % - -   11/22/18 0215 104/54 - - - 79 - 97 % - -   11/22/18 0210 - - - - 80 - 97 % - -   11/22/18 0200 118/56 - - - 82 - 98 % - -   11/22/18 0145 121/63 - - - 85 - 98 % - -   11/22/18 0135 115/65 - - - 88 - 95 % - -   11/22/18 0110 110/49 - - - - - - - -   11/22/18 0057 113/62 - - - (!) 33 - 93 % - -   11/22/18 0054 (!) 55/37 - - - - - 95 % - -   11/22/18 0003 158/73 98.4  F (36.9  C) Temporal - 84 16 96 % 1.549 m (5' 1\") 70.3 kg (155 lb)     Physical Exam  General: Uncomfortable appearing elderly woman laying supine.    Eye:  Pupils are equal, round, and reactive.  Extraocular movements intact.    ENT:  No rhinorrhea.  Moist mucus membranes.  Normal tongue and tonsil.    Cardiac:  Regular rate and rhythm.  No murmurs, gallops, or rubs.    Pulmonary:  Clear to auscultation bilaterally.  No wheezes, rales, or rhonchi.    Abdomen:  Positive bowel sounds.  Abdomen is soft and non-distended, without focal tenderness. No Kimball's sign.    Musculoskeletal:  Normal movement of all extremities without evidence for deficit. No reproducible chest wall pain. No midline tenderness to spine with complaints of generalized soft tissue discomfort in the inferior neck and trapezius. No lower extremity edema noted.     Skin:  Warm and dry without rashes.    Neurologic:  Non-focal exam without asymmetric weakness or numbness.     Psychiatric:  Normal affect with appropriate interaction with examiner.    Emergency Department Course     ECG:  ECG taken at 0009, ECG read at 0030  Normal sinus rhythm  Possible left atrial enlargement  Nonspecific ST and T wave abnormality  Abnormal ECG  Rate 72 bpm. NH interval 178 ms. QRS duration 76 ms. QT/QTc 392/429 ms. P-R-T axes 48 24 39.    ECG:  ECG taken at 0058, ECG read at 0102  Normal sinus rhythm  Nonspecific T wave abnormality  Prolonged QT  Abnormal ECG  Rate 98 " bpm. LA interval 164 ms. QRS duration 84 ms. QT/QTc 388/495 ms. P-R-T axes 42 28 31.    Imaging:  Radiology findings were communicated with the patient who voiced understanding of the findings.    CT Aortic Survey w Contrast  1. Mildly atheromatous aorta without aneurysm or dissection.  2. No other acute findings.  3. Trace pericardial thickening or fluid.  4. Colonic diverticulosis.  KARMA ZIEGLER MD  Reading per radiology    Laboratory:  Laboratory findings were communicated with the patient who voiced understanding of the findings.    CBC: WBC 9.2, HGB 13.4,   BMP: Glucose 107 (H), GFR Estimate 51 (L) o/w WNL (Creatinine 1.02)  Troponin (Collected: 0020): <0.015    Interventions:  0012 GI Cocktail 30 ml Oral  0057 Atropine 0.5 mg IV  0059 NS 1000 ml IV  0100 Zofran 4 mg IV  0204 Dilaudid 0.5 mg IV    Emergency Department Course:    0004 Nursing notes and vitals reviewed.    0009 EKG obtained as noted above.    0020 IV was inserted and blood was drawn for laboratory testing, results above.    0031 I performed an exam of the patient as documented above.     0054 Patient's heart rate dropped into the 30s. Patient states she feels terrible and is nauseous.     0056 Pressures 55/37.     0057 0.5 mg atropine administered.     0058  Patient's heart rate at 95. Pressures at 113/62. Repeat EKG obtained.     0059 Patient reports resolution of her nausea but states her chest and neck pain have worsened.     0128 The patient was sent for a CT aortic survey while in the emergency department, results above.     0152 Recheck and update. Patient's vitals appear normal.     0220 I spoke with Dr. Swift of the hospitalist service from [facility] regarding patient's presentation, findings, and plan of care.    0417 I personally reviewed the laboratory and imaging results with the patient and answered all related questions prior to admission.    Impression & Plan      Medical Decision Making:  Collette Sanchez is a  87 year old female who presents to the emergency department today for evaluation of chest pain that awoke her this evening at 11 PM.  She was brought to the [facility] today by her son via 2-hour car ride for the holiday and had a river evening with a large meal.  She went to bed at 930 but then awoke at 11 PM with a significant chest pressure that is pleuritic in nature and radiates to her back and neck.  She denies ever having symptoms like this before.  She describes lifting a cat litter box earlier today, but does not believe that this is terribly heavy or would explain her discomfort.    On my exam, she does appear to be uncomfortable but otherwise has appropriate vital signs.  An EKG was obtained which is essentially unremarkable.  Laboratory profile was performed which was normal.  I was called out of another patient's room to emergently see this patient because she suddenly became markedly bradycardic.  As I entered the room, she was now pale and diaphoretic with a heart rate in the 30s.  A blood pressure check showed her only to be 55 systolic.  I immediately called for an EKG and there was some delay in getting the stickers on her chest.  In conjunction, I requested 0.5 of atropine be provided.  The patient became near syncopal and I anticipated starting chest compressions and moving her to a front room.  However, as the atropine was pushed, her heart rate suddenly returned back to  and her blood pressure came up immediately.  She perked up immediately noting that she felt much improved, though now she was feeling that her chest and neck were more uncomfortable than when she had initially come in.    With her vital signs now stable, I felt that she was appropriate for transfer to radiology for imaging.  I do not find that she has any risk factors for pulmonary embolism and I felt that an aortic dissection would be a far more likely diagnosis than PE and therefore I selected a aortic survey.  This  does not show any evidence of acute aortic disease, acute thoracic disease, or any significant findings in the abdomen.  On multiple reassessments, I continue to find that she is stable and I provided her with something for pain with improvement.  Nonetheless, I was very concerned with this prolonged bradycardia spell which lasted greater than 5 minutes in the setting of an elderly woman having chest pain.  Therefore, I explained that she certainly would require admission to the hospital.  I spoke with Dr. Swift of the hospitalist service will admit the patient to the CICU for very close cardiac monitoring and further workup of the chest pain.  The patient's questions are answered and she is comfortable with the plan for admission.    Diagnosis:    ICD-10-CM    1. Pleuritic chest pain R07.81    2. Neck pain M54.2    3. Bradycardia - resolved R00.1      Disposition:   The patient is admitted into the care of Dr. Swift.    Critical Care time was 30 minutes for this patient excluding procedures.     Scribe Disclosure:  I, Bettie Eubanks, am serving as a scribe at 12:16 AM on 11/22/2018 to document services personally performed by Trierweiler, Chad A, MD based on my observations and the provider's statements to me.      EMERGENCY DEPARTMENT       Trierweiler, Chad A, MD  11/22/18 1725

## 2018-11-22 NOTE — ED NOTES
[facility]  ED Nurse Handoff Report    ED Chief complaint: Chest Pain (neck pain and chest pain starting about 1.5 hours ago when patient woke up. )      ED Diagnosis:   Final diagnoses:   Pleuritic chest pain   Neck pain   Bradycardia - resolved       Code Status: Full Code    Allergies:   Allergies   Allergen Reactions     Cephalosporins        Activity level - Baseline/Home:  Independent    Activity Level - Current:   Independent     Needed?: No    Isolation: No  Infection: Not Applicable  Bariatric?: No    Vital Signs:   Vitals:    11/22/18 0135 11/22/18 0145 11/22/18 0200 11/22/18 0210   BP: 115/65 121/63 118/56    Resp:       Temp:       TempSrc:       SpO2: 95% 98% 98% 97%   Weight:       Height:           Cardiac Rhythm: ,   Cardiac  Cardiac Rhythm: Normal sinus rhythm    Pain level: 0-10 Pain Scale: 9    Is this patient confused?: No   [place] - Suicide Severity Rating Scale Completed?  Yes  If yes, what color did the patient score?  White    Patient Report: Initial Complaint: Chest discomfort  Focused Assessment: Pt states she is having CP and neck discomfort.  Pain increases with deep breathing.  Rates it 9/10.  States she feels very dry.  Tests Performed: Labs and CT  Abnormal Results: Unremarkable tests.  Pt did have an episode of bradycardia in the 30s, became nauseated and BP fell. which lasted approx 5 minutes.  She was given Atropine 0.5 mg.  Rate went back into 70s-80s and sx got better.  Treatments provided: Atropine, dilaudid, zofran and fluids    Family Comments: In RM    OBS brochure/video discussed/provided to patient/family: N/A              Name of person given brochure if not patient: na              Relationship to patient: na  ED Medications:   Medications   atropine 1 MG/10ML injection (not administered)   ondansetron (ZOFRAN) injection 4 mg (4 mg Intravenous Given 11/22/18 0100)   ondansetron (ZOFRAN) 2 MG/ML injection (not administered)   lidocaine  (viscous) (XYLOCAINE) 2 % 15 mL, alum & mag hydroxide-simethicone (MYLANTA ES/MAALOX  ES) 15 mL GI Cocktail (30 mLs Oral Given 11/22/18 0012)   atropine injection 0.5 mg (0.5 mg Intravenous Given 11/22/18 0100)   0.9% sodium chloride BOLUS (1,000 mLs Intravenous New Bag 11/22/18 0059)   sodium chloride (PF) 0.9% PF flush 70 mL (70 mLs Intravenous Given 11/22/18 0130)   iopamidol (ISOVUE-370) solution 80 mL (80 mLs Intravenous Given 11/22/18 0129)   HYDROmorphone (PF) (DILAUDID) injection 0.5 mg (0.5 mg Intravenous Given 11/22/18 0204)       Drips infusing?:  No    For the majority of the shift this patient was Green.   Interventions performed were na.    Severe Sepsis OR Septic Shock Diagnosis Present: No    To be done/followed up on inpatient unit:  na    ED NURSE PHONE NUMBER: [id number]

## 2018-11-22 NOTE — PROGRESS NOTES
Intensivist:  87F presented to the ED with pleuritic chest pain radiating to the neck.  Had a vasovagal episode in the ED that terminated with atropine.  On my visit she is awake, alert, pleasant, conversant.  Pulse in 60s with bp 127/62.  Satting well and unlabored on room air.  Labs with acceptable electrolytes.  Creat ok 1.02.  Cbc ok.  ekg sinus at 98, nl axis, nl int.  Troponin and CT angio negative (for dissection).  Agree with cardiac workup and treatment for possible pericardial disease by hospitalist service.  Intensivist service remains available for formal consultation or emergent intervention as needed.

## 2018-11-22 NOTE — PLAN OF CARE
Problem: Patient Care Overview  Goal: Plan of Care/Patient Progress Review  PT:  Orders received and chart reviewed. Patient admitted on 11/22/18 for evaluation of chest pain worsened with deep inspiration, position changes. Cardiac workup still in progress. Will hold PT evaluation this date to allow for established POC and medical management prior to initiating evaluation.

## 2018-11-23 ENCOUNTER — APPOINTMENT (OUTPATIENT)
Dept: PHYSICAL THERAPY | Facility: CLINIC | Age: 83
DRG: 316 | End: 2018-11-23
Attending: INTERNAL MEDICINE
Payer: MEDICARE

## 2018-11-23 VITALS
SYSTOLIC BLOOD PRESSURE: 138 MMHG | HEART RATE: 75 BPM | WEIGHT: 158.4 LBS | HEIGHT: 61 IN | DIASTOLIC BLOOD PRESSURE: 72 MMHG | TEMPERATURE: 98.5 F | BODY MASS INDEX: 29.91 KG/M2 | OXYGEN SATURATION: 94 % | RESPIRATION RATE: 18 BRPM

## 2018-11-23 LAB
ALBUMIN SERPL-MCNC: 2.6 G/DL (ref 3.4–5)
ALP SERPL-CCNC: 59 U/L (ref 40–150)
ALT SERPL W P-5'-P-CCNC: 55 U/L (ref 0–50)
ANION GAP SERPL CALCULATED.3IONS-SCNC: 4 MMOL/L (ref 3–14)
AST SERPL W P-5'-P-CCNC: 56 U/L (ref 0–45)
BASOPHILS # BLD AUTO: 0 10E9/L (ref 0–0.2)
BASOPHILS NFR BLD AUTO: 0.3 %
BILIRUB SERPL-MCNC: 1.1 MG/DL (ref 0.2–1.3)
BUN SERPL-MCNC: 14 MG/DL (ref 7–30)
CALCIUM SERPL-MCNC: 8.2 MG/DL (ref 8.5–10.1)
CHLORIDE SERPL-SCNC: 111 MMOL/L (ref 94–109)
CO2 SERPL-SCNC: 26 MMOL/L (ref 20–32)
CREAT SERPL-MCNC: 0.65 MG/DL (ref 0.52–1.04)
DIFFERENTIAL METHOD BLD: ABNORMAL
EOSINOPHIL # BLD AUTO: 0.2 10E9/L (ref 0–0.7)
EOSINOPHIL NFR BLD AUTO: 2.1 %
ERYTHROCYTE [DISTWIDTH] IN BLOOD BY AUTOMATED COUNT: 13.2 % (ref 10–15)
GFR SERPL CREATININE-BSD FRML MDRD: 85 ML/MIN/1.7M2
GLUCOSE SERPL-MCNC: 93 MG/DL (ref 70–99)
HCT VFR BLD AUTO: 33.8 % (ref 35–47)
HGB BLD-MCNC: 11.5 G/DL (ref 11.7–15.7)
IMM GRANULOCYTES # BLD: 0 10E9/L (ref 0–0.4)
IMM GRANULOCYTES NFR BLD: 0.1 %
LYMPHOCYTES # BLD AUTO: 1.5 10E9/L (ref 0.8–5.3)
LYMPHOCYTES NFR BLD AUTO: 20.7 %
MCH RBC QN AUTO: 30.3 PG (ref 26.5–33)
MCHC RBC AUTO-ENTMCNC: 34 G/DL (ref 31.5–36.5)
MCV RBC AUTO: 89 FL (ref 78–100)
MONOCYTES # BLD AUTO: 0.8 10E9/L (ref 0–1.3)
MONOCYTES NFR BLD AUTO: 11.5 %
NEUTROPHILS # BLD AUTO: 4.6 10E9/L (ref 1.6–8.3)
NEUTROPHILS NFR BLD AUTO: 65.3 %
NRBC # BLD AUTO: 0 10*3/UL
NRBC BLD AUTO-RTO: 0 /100
PLATELET # BLD AUTO: 172 10E9/L (ref 150–450)
POTASSIUM SERPL-SCNC: 4.4 MMOL/L (ref 3.4–5.3)
PROT SERPL-MCNC: 5.7 G/DL (ref 6.8–8.8)
RBC # BLD AUTO: 3.8 10E12/L (ref 3.8–5.2)
SODIUM SERPL-SCNC: 141 MMOL/L (ref 133–144)
WBC # BLD AUTO: 7.1 10E9/L (ref 4–11)

## 2018-11-23 PROCEDURE — 99231 SBSQ HOSP IP/OBS SF/LOW 25: CPT | Performed by: INTERNAL MEDICINE

## 2018-11-23 PROCEDURE — A9270 NON-COVERED ITEM OR SERVICE: HCPCS | Performed by: INTERNAL MEDICINE

## 2018-11-23 PROCEDURE — 99239 HOSP IP/OBS DSCHRG MGMT >30: CPT | Performed by: INTERNAL MEDICINE

## 2018-11-23 PROCEDURE — 25000132 ZZH RX MED GY IP 250 OP 250 PS 637: Performed by: INTERNAL MEDICINE

## 2018-11-23 PROCEDURE — 36415 COLL VENOUS BLD VENIPUNCTURE: CPT | Performed by: INTERNAL MEDICINE

## 2018-11-23 PROCEDURE — 80053 COMPREHEN METABOLIC PANEL: CPT | Performed by: INTERNAL MEDICINE

## 2018-11-23 PROCEDURE — 97116 GAIT TRAINING THERAPY: CPT | Mod: GP

## 2018-11-23 PROCEDURE — 97530 THERAPEUTIC ACTIVITIES: CPT | Mod: GP

## 2018-11-23 PROCEDURE — 85025 COMPLETE CBC W/AUTO DIFF WBC: CPT | Performed by: INTERNAL MEDICINE

## 2018-11-23 PROCEDURE — 97161 PT EVAL LOW COMPLEX 20 MIN: CPT | Mod: GP

## 2018-11-23 PROCEDURE — 40000193 ZZH STATISTIC PT WARD VISIT

## 2018-11-23 RX ORDER — COLCHICINE 0.6 MG/1
0.6 TABLET ORAL DAILY
Qty: 30 TABLET | Refills: 0 | Status: SHIPPED | OUTPATIENT
Start: 2018-11-24

## 2018-11-23 RX ORDER — ASPIRIN 325 MG
650 TABLET, DELAYED RELEASE (ENTERIC COATED) ORAL 3 TIMES DAILY
Qty: 180 TABLET | Refills: 0 | Status: SHIPPED | OUTPATIENT
Start: 2018-11-23

## 2018-11-23 RX ORDER — OMEPRAZOLE 40 MG/1
40 CAPSULE, DELAYED RELEASE ORAL
Qty: 30 CAPSULE | Refills: 0 | Status: SHIPPED | OUTPATIENT
Start: 2018-11-24

## 2018-11-23 RX ADMIN — OMEPRAZOLE 20 MG: 20 CAPSULE, DELAYED RELEASE ORAL at 08:54

## 2018-11-23 RX ADMIN — COLCHICINE 0.6 MG: 0.6 TABLET, FILM COATED ORAL at 08:55

## 2018-11-23 RX ADMIN — SENNOSIDES AND DOCUSATE SODIUM 1 TABLET: 8.6; 5 TABLET ORAL at 08:55

## 2018-11-23 RX ADMIN — ASPIRIN 650 MG: 325 TABLET, DELAYED RELEASE ORAL at 08:55

## 2018-11-23 NOTE — PROGRESS NOTES
11/23/18 0900   Quick Adds   Type of Visit Initial PT Evaluation   Living Environment   Lives With alone   Living Arrangements house   Home Accessibility no concerns  (elevator )   Number of Stairs to Enter Home (elevator)   Number of Stairs Within Home (elevator )   Self-Care   Usual Activity Tolerance good   Current Activity Tolerance fair   Regular Exercise yes   Activity/Exercise Type walking   Exercise Amount/Frequency 3-5 times/wk   Equipment Currently Used at Home none   Functional Level Prior   Ambulation 0-->independent   Transferring 0-->independent   Toileting 0-->independent   Bathing 0-->independent   Dressing 0-->independent   Eating 0-->independent   Communication 0-->understands/communicates without difficulty   Swallowing 0-->swallows foods/liquids without difficulty   Cognition 0 - no cognition issues reported   Fall history within last six months no   Which of the above functional risks had a recent onset or change? none   General Information   Onset of Illness/Injury or Date of Surgery - Date 11/22/18   Referring Physician Jamison, Dmitriy Stark MD   Patient/Family Goals Statement Return home    Pertinent History of Current Problem (include personal factors and/or comorbidities that impact the POC) Patient admitted on 11/22/18 for evaluation of chest pain worsened with deep inspiration, position changes and radiating into her neck. Patient found to have pericarditis. Patient with PMH of vertigo, HTN, and vertigo    Precautions/Limitations fall precautions   Weight-Bearing Status - LUE full weight-bearing   Weight-Bearing Status - RUE full weight-bearing   Weight-Bearing Status - LLE full weight-bearing   Weight-Bearing Status - RLE full weight-bearing   General Observations Patient sitting at EOB upon arrival of therapist, pleasant and agreeable to working with PT    Cognitive Status Examination   Orientation orientation to person, place and time   Level of Consciousness alert   Follows Commands and  "Answers Questions 100% of the time;able to follow multistep instructions   Pain Assessment   Patient Currently in Pain No   Integumentary/Edema   Integumentary/Edema no deficits were identifed   Posture    Posture Not impaired   Range of Motion (ROM)   ROM Comment B LE ROM WNL    Strength   Strength Comments Not formally assessed but at least 3+/5 with functional transfers and gait    Bed Mobility   Bed Mobility Comments Supine>sit completed independently    Transfer Skills   Transfer Comments Sit>stand completed independently with no AD   Gait   Gait Comments Patient ambulated 10 feet with no AD and supervision, steady gait with no overt LOB noted    Balance   Balance no deficits were identified   General Therapy Interventions   Planned Therapy Interventions gait training   Clinical Impression   Criteria for Skilled Therapeutic Intervention yes, treatment indicated   PT Diagnosis Decreased tolerance to activity    Influenced by the following impairments weakness   Functional limitations due to impairments gait    Clinical Presentation Stable/Uncomplicated   Clinical Presentation Rationale Based on PMH, current presentation, and social support    Clinical Decision Making (Complexity) Low complexity   Therapy Frequency` daily   Predicted Duration of Therapy Intervention (days/wks) 1 day   Anticipated Discharge Disposition Home   Risk & Benefits of therapy have been explained Yes   Patient, Family & other staff in agreement with plan of care Yes   [facility] [employer] TM \"6 Clicks\"   2016, Trustees of [facility], under license to [employer].  All rights reserved.   6 Clicks Short Forms Basic Mobility Inpatient Short Form   [facility] AM-PAC  \"6 Clicks\" V.2 Basic Mobility Inpatient Short Form   1. Turning from your back to your side while in a flat bed without using bedrails? 4 - None   2. Moving from lying on your back to sitting on the side of a flat bed without using bedrails? 4 - None   3. " Moving to and from a bed to a chair (including a wheelchair)? 4 - None   4. Standing up from a chair using your arms (e.g., wheelchair, or bedside chair)? 4 - None   5. To walk in hospital room? 4 - None   6. Climbing 3-5 steps with a railing? 3 - A Little   Basic Mobility Raw Score (Score out of 24.Lower scores equate to lower levels of function) 23   Total Evaluation Time   Total Evaluation Time (Minutes) 10

## 2018-11-23 NOTE — PLAN OF CARE
Problem: Patient Care Overview  Goal: Plan of Care/Patient Progress Review  Outcome: Improving  Pt alert and oriented. Chest pain worse with inspiration and lying supine/on left side. Scheduled aspirin given. Lungs with few crackles in bilateral bases. SR, HR 60's. Ambulating to bathroom SBA. Transfer to CCU.

## 2018-11-23 NOTE — PROGRESS NOTES
"Cardiology Progress Note   Date of service: 18       Assessment and Plan:     1. Pericarditis, symptoms resolved    Unknown etiology.  No viral URI preceding.  Nonetheless, symptoms resolved on aspirin and colchicine.    Follow-up in 1 month with [facility] in cardiology clinic continue aspirin and colchicine in the meantime.        2. Hypertension, adequate control at this point but bradycardic on the atenolol on admission    Would avoid the atenolol and consider a non-renally cleared beta-blocker in the future.  Patient also has constipation, would avoid the amlodipine for that reason.  We can adjust her antihypertensive regimen as an outpatient.      Okay to discharge off the atenolol and amlodipine currently.  Could consider an ACE inhibitor and/or diuretic if needed.  Will readdress in 1 month with [facility] cardiology visit.                   Interval History:   Patient is accompanied by her sons.  She feels 100% better.  Her chest pain has resolved on the aspirin and colchicine.  She would like to go home today.              Review of Systems:   As per subjective, otherwise 5 systems reviewed and negative.           Physical Exam:     Vitals were reviewed  Blood pressure 138/72, pulse 75, temperature 98.5  F (36.9  C), temperature source Oral, resp. rate 18, height 1.549 m (5' 1\"), weight 71.8 kg (158 lb 6.4 oz), SpO2 94 %.  Temperatures:  Current - Temp: 98.5  F (36.9  C); Max - Temp  Av.2  F (36.8  C)  Min: 97.9  F (36.6  C)  Max: 98.5  F (36.9  C)  Respiration range: Resp  Av.6  Min: 12  Max: 27  Pulse range: No Data Recorded  Blood pressure range: Systolic (24hrs), Av , Min:108 , Max:147   ; Diastolic (24hrs), Av, Min:45, Max:99    Pulse oximetry range: SpO2  Av.3 %  Min: 94 %  Max: 98 %    Intake/Output Summary (Last 24 hours) at 18 1238  Last data filed at 18 0848   Gross per 24 hour   Intake             2423 ml   Output                0 ml   Net             2423 ml "       Constitutional:   awake, alert, cooperative, no apparent distress, and appears stated age     Eyes:   Lids and lashes normal, pupils equal, round and reactive to light, extra ocular muscles intact, sclera clear, conjunctiva normal     Neck:   supple, symmetrical, trachea midline, no JVD     Back:   symmetric     Lungs:   Clear     Cardiovascular:   Regular without rub or gallop     Abdomen:   benign     Musculoskeletal:   motor strength is 5 out of 5 all extremities bilaterally     Neurologic:   Grossly nonfocal     Skin:   normal skin color, texture, turgor            Medications:     Current Facility-Administered Medications Ordered in Epic   Medication Dose Route Frequency Last Rate Last Dose     acetaminophen (TYLENOL) tablet 650 mg  650 mg Oral Q4H PRN   650 mg at 11/22/18 1843     aspirin (ASA) EC tablet 650 mg  650 mg Oral TID   650 mg at 11/23/18 0855     atropine injection 0.4 mg  0.4 mg Intravenous Once PRN         bisacodyl (DULCOLAX) Suppository 10 mg  10 mg Rectal Daily PRN         [START ON 11/24/2018] colchicine (COLCYRS) tablet 0.6 mg  0.6 mg Oral Daily         hydrALAZINE (APRESOLINE) injection 10 mg  10 mg Intravenous Q4H PRN         HYDROmorphone (PF) (DILAUDID) injection 0.2 mg  0.2 mg Intravenous Q2H PRN   0.2 mg at 11/22/18 0459     lidocaine (LMX4) cream   Topical Q1H PRN         lidocaine 1 % 1 mL  1 mL Other Q1H PRN         magnesium sulfate 4 g in 100 mL sterile water (premade)  4 g Intravenous Q4H PRN         melatonin tablet 1 mg  1 mg Oral At Bedtime PRN         naloxone (NARCAN) injection 0.1-0.4 mg  0.1-0.4 mg Intravenous Q2 Min PRN         nitroGLYcerin (NITROSTAT) sublingual tablet 0.4 mg  0.4 mg Sublingual Q5 Min PRN         omeprazole (priLOSEC) CR capsule 20 mg  20 mg Oral QAM AC   20 mg at 11/23/18 0854     ondansetron (ZOFRAN-ODT) ODT tab 4 mg  4 mg Oral Q6H PRN        Or     ondansetron (ZOFRAN) injection 4 mg  4 mg Intravenous Q6H PRN         oxyCODONE (ROXICODONE)  tablet 5-10 mg  5-10 mg Oral Q3H PRN         polyethylene glycol (MIRALAX/GLYCOLAX) Packet 17 g  17 g Oral Daily PRN         potassium chloride (KLOR-CON) Packet 20-40 mEq  20-40 mEq Oral or Feeding Tube Q2H PRN         potassium chloride 10 mEq in 100 mL intermittent infusion with 10 mg lidocaine  10 mEq Intravenous Q1H PRN         potassium chloride 10 mEq in 100 mL sterile water intermittent infusion (premix)  10 mEq Intravenous Q1H PRN         potassium chloride 20 mEq in 50 mL intermittent infusion  20 mEq Intravenous Q1H PRN         potassium chloride SA (K-DUR/KLOR-CON M) CR tablet 20-40 mEq  20-40 mEq Oral Q2H PRN         potassium phosphate 15 mmol in D5W 250 mL intermittent infusion  15 mmol Intravenous Daily PRN         potassium phosphate 20 mmol in D5W 250 mL intermittent infusion  20 mmol Intravenous Q6H PRN         potassium phosphate 20 mmol in D5W 500 mL intermittent infusion  20 mmol Intravenous Q6H PRN         potassium phosphate 25 mmol in D5W 500 mL intermittent infusion  25 mmol Intravenous Q8H PRN         prochlorperazine (COMPAZINE) injection 5 mg  5 mg Intravenous Q6H PRN        Or     prochlorperazine (COMPAZINE) tablet 5 mg  5 mg Oral Q6H PRN        Or     prochlorperazine (COMPAZINE) Suppository 12.5 mg  12.5 mg Rectal Q12H PRN         senna-docusate (SENOKOT-S;PERICOLACE) 8.6-50 MG per tablet 1 tablet  1 tablet Oral BID PRN        Or     senna-docusate (SENOKOT-S;PERICOLACE) 8.6-50 MG per tablet 2 tablet  2 tablet Oral BID PRN         senna-docusate (SENOKOT-S;PERICOLACE) 8.6-50 MG per tablet 1 tablet  1 tablet Oral BID   1 tablet at 11/23/18 0855    Or     senna-docusate (SENOKOT-S;PERICOLACE) 8.6-50 MG per tablet 2 tablet  2 tablet Oral BID         sodium chloride (PF) 0.9% PF flush 3 mL  3 mL Intracatheter Q1H PRN         sodium chloride (PF) 0.9% PF flush 3 mL  3 mL Intracatheter Q8H         No current Epic-ordered outpatient prescriptions on file.                Data:   All  laboratory data reviewed  Results for orders placed or performed during the hospital encounter of 18 (from the past 24 hour(s))   Troponin I   Result Value Ref Range    Troponin I ES <0.015 0.000 - 0.045 ug/L   ECHO COMPLETE WITH OPTISON    Narrative    [id number]  [id number]  [id number]  [id number]^ANDINO^GERALDINE^JERZY           [facility]  Echocardiography Laboratory  [address] [place] [address]        Name: CARLOS ENRIQUE LOPEZ  MRN: [id number]  : 01/10/1931  Study Date: 2018 04:05 PM  Age: 87 yrs  Gender: Female  Patient Location: [facility]  Reason For Study: Bradycardia - Sinus  History: RT BREAST CANCER  Ordering Physician: GERALDINE ANDINO  Referring Physician: ZENA JARA  Performed By: Gio Elmore RDCS     BSA: 1.7 m2  Height: 61 in  Weight: 155 lb  HR: 66  BP: 128/89 mmHg  _____________________________________________________________________________  __        Procedure  Complete Portable Echo Adult. Contrast Optison.  _____________________________________________________________________________  __        Interpretation Summary     1. Normal left ventricular systolic function. Visually estimated LVEF 60-65%.  2. Normal right ventricular size and systolic function.  3. Trace mitral regurgitation. Mild tricuspid regurgitation.     There is no comparison study available.  _____________________________________________________________________________  __        Left Ventricle  The left ventricle is normal in size. There is normal left ventricular wall  thickness. Left ventricular systolic function is normal. The visual ejection  fraction is estimated at 60-65%. Grade I or early diastolic dysfunction. No  regional wall motion abnormalities noted.     Right Ventricle  The right ventricle is normal in size and function.     Atria  The left atrium is severely dilated. The right atrium is mildly dilated. There  is no color Doppler evidence of an atrial shunt.     Mitral Valve  Calcified  mitral apparatus. There is trace mitral regurgitation.        Tricuspid Valve  Normal tricuspid valve. There is mild (1+) tricuspid regurgitation. The right  ventricular systolic pressure is approximated at 42.5 mmHg plus the right  atrial pressure.     Aortic Valve  The aortic valve is trileaflet with aortic valve sclerosis. There is trace  aortic regurgitation. No hemodynamically significant valvular aortic stenosis.     Pulmonic Valve  The pulmonic valve is not well visualized. There is trace pulmonic valvular  regurgitation.     Vessels  The aortic root is normal size. The ascending aorta is Borderline dilated.  (3.7 cm). The IVC is normal in size and reactivity with respiration,  suggesting normal central venous pressure.     Pericardium  There is no pericardial effusion.        Rhythm  Sinus rhythm was noted.  _____________________________________________________________________________  __  MMode/2D Measurements & Calculations  IVSd: 0.88 cm     LVIDd: 4.3 cm  LVIDs: 1.6 cm  LVPWd: 0.82 cm  FS: 62.6 %  LV mass(C)d: 113.8 grams  LV mass(C)dI: 67.1 grams/m2  Ao root diam: 3.4 cm  LA dimension: 4.0 cm  asc Aorta Diam: 3.7 cm  LA/Ao: 1.2  LA Volume (BP): 71.8 ml  LA Volume Index (BP): 42.2 ml/m2  RWT: 0.38           Doppler Measurements & Calculations  MV E max keke: 103.0 cm/sec  MV A max keke: 86.3 cm/sec  MV E/A: 1.2  MV dec time: 0.18 sec  PA acc time: 0.19 sec  TR max keke: 325.8 cm/sec  TR max P.5 mmHg  E/E' av.5  Lateral E/e': 15.5  Medial E/e': 17.5           _____________________________________________________________________________  __           Report approved by: Dr Lulu Massey 2018 05:31 PM      Comprehensive metabolic panel   Result Value Ref Range    Sodium 141 133 - 144 mmol/L    Potassium 4.4 3.4 - 5.3 mmol/L    Chloride 111 (H) 94 - 109 mmol/L    Carbon Dioxide 26 20 - 32 mmol/L    Anion Gap 4 3 - 14 mmol/L    Glucose 93 70 - 99 mg/dL    Urea Nitrogen 14 7 - 30 mg/dL     Creatinine 0.65 0.52 - 1.04 mg/dL    GFR Estimate 85 >60 mL/min/1.7m2    GFR Estimate If Black >90 >60 mL/min/1.7m2    Calcium 8.2 (L) 8.5 - 10.1 mg/dL    Bilirubin Total 1.1 0.2 - 1.3 mg/dL    Albumin 2.6 (L) 3.4 - 5.0 g/dL    Protein Total 5.7 (L) 6.8 - 8.8 g/dL    Alkaline Phosphatase 59 40 - 150 U/L    ALT 55 (H) 0 - 50 U/L    AST 56 (H) 0 - 45 U/L   CBC with platelets differential   Result Value Ref Range    WBC 7.1 4.0 - 11.0 10e9/L    RBC Count 3.80 3.8 - 5.2 10e12/L    Hemoglobin 11.5 (L) 11.7 - 15.7 g/dL    Hematocrit 33.8 (L) 35.0 - 47.0 %    MCV 89 78 - 100 fl    MCH 30.3 26.5 - 33.0 pg    MCHC 34.0 31.5 - 36.5 g/dL    RDW 13.2 10.0 - 15.0 %    Platelet Count 172 150 - 450 10e9/L    Diff Method Automated Method     % Neutrophils 65.3 %    % Lymphocytes 20.7 %    % Monocytes 11.5 %    % Eosinophils 2.1 %    % Basophils 0.3 %    % Immature Granulocytes 0.1 %    Nucleated RBCs 0 0 /100    Absolute Neutrophil 4.6 1.6 - 8.3 10e9/L    Absolute Lymphocytes 1.5 0.8 - 5.3 10e9/L    Absolute Monocytes 0.8 0.0 - 1.3 10e9/L    Absolute Eosinophils 0.2 0.0 - 0.7 10e9/L    Absolute Basophils 0.0 0.0 - 0.2 10e9/L    Abs Immature Granulocytes 0.0 0 - 0.4 10e9/L    Absolute Nucleated RBC 0.0        All laboratory data reviewed  No results found for: CHOL  No results found for: HDL  No results found for: LDL  No results found for: TRIG  No results found for: CHOLHDLRATIO  No results found for: TSH  Last Basic Metabolic Panel:  Lab Results   Component Value Date     11/23/2018      Lab Results   Component Value Date    POTASSIUM 4.4 11/23/2018     Lab Results   Component Value Date    CHLORIDE 111 11/23/2018     Lab Results   Component Value Date    BRIANA 8.2 11/23/2018     Lab Results   Component Value Date    CO2 26 11/23/2018     Lab Results   Component Value Date    BUN 14 11/23/2018     Lab Results   Component Value Date    CR 0.65 11/23/2018     Lab Results   Component Value Date    GLC 93 11/23/2018     Lab  Results   Component Value Date    WBC 7.1 11/23/2018     Lab Results   Component Value Date    RBC 3.80 11/23/2018     Lab Results   Component Value Date    HGB 11.5 11/23/2018     Lab Results   Component Value Date    HCT 33.8 11/23/2018     Lab Results   Component Value Date    MCV 89 11/23/2018     Lab Results   Component Value Date    MCH 30.3 11/23/2018     Lab Results   Component Value Date    MCHC 34.0 11/23/2018     Lab Results   Component Value Date    RDW 13.2 11/23/2018     Lab Results   Component Value Date     11/23/2018

## 2018-11-23 NOTE — PLAN OF CARE
Problem: Patient Care Overview  Goal: Plan of Care/Patient Progress Review  Outcome: Adequate for Discharge Date Met: 11/23/18  A/Ox4. Up independent. Lung sounds are clear. Denies pain. Tele SR. Patient discharged @ 1545. Discharge discussed with patient and son and all questions answered .

## 2018-11-23 NOTE — PLAN OF CARE
Problem: Patient Care Overview  Goal: Plan of Care/Patient Progress Review  Outcome: Improving  VSS and WNL for this pt. Pt A&O, LS clear and tele SR. Pt transfers with SBA, and continues to improve. Plan for discharge in 1 to 2 days. Will pass on and continue to monitor.

## 2018-11-23 NOTE — PLAN OF CARE
Problem: Patient Care Overview  Goal: Plan of Care/Patient Progress Review  PT:  Orders received, chart reviewed, and initial physical therapy evaluation completed. Patient admitted on 11/22/18 for evaluation of chest pain worsened with deep inspiration, position changes and radiating into her neck. Patient found to have pericarditis. [place] and stairs if patient chooses to use them. Patient states she is very active at baseline and tries to walk ~1 mile/day when it is nice outside. Patient is independent at baseline with no AD.     Discharge Planner PT   Patient plan for discharge: Home   Current status: Patient sitting at EOB upon arrival of therapist, agreeable to working with PT. Vitals stable throughout session and logged in vitals flowsheet. Patient with no complaints of chest pain, SOB, dizziness, or lightheadedness with mobility during PT. Sit<>stand transfers completed independently. Patient ambulated 250 feet independently with no AD, steady throughout with no overt LOB noted. Patient in chair at end of session, discussed continued walking program with nursing staff throughout the day to upkeep tolerance to activity, patient in agreement. Discussed discharge recommendations with patient and son, present at end of session. Patient states her nearest child lives ~17 miles away but she does have a neighbor/good friend who helps with groceries and such as needed, who would be able to stop by and check in on patient and assist with household tasks as needed. No further skilled PT needs identified, will complete orders.   Barriers to return to prior living situation: none indicated   Recommendations for discharge: Home with assist for household tasks from neighbor  Rationale for recommendations: Patient mobilizing at baseline with no AD and independently. Recommend continued walking program with nursing staff throughout the day to upkeep tolerance to  activity during hospitalization. Patient safe to discharge home, once medically able, with assist from neighbor/good friend for household tasks. No further skilled PT needs identified, will discharge from acute PT and complete orders.        Entered by: Amelia Adams 11/23/2018 10:10 AM        Physical Therapy Discharge Summary    Reason for therapy discharge:    All goals and outcomes met, no further needs identified.    Progress towards therapy goal(s). See goals on Care Plan in [facility] health record for goal details.  Goals met    Therapy recommendation(s):    No further therapy is recommended.

## 2018-11-23 NOTE — PLAN OF CARE
Problem: Patient Care Overview  Goal: Plan of Care/Patient Progress Review  Outcome: Improving  Alert and oriented x 4. VS stable, on RA and has some complaints of chest pain that she was given Tylenol with good pain relief. Tele SR with rates in the 70's. SBA with activity.

## 2018-11-23 NOTE — DISCHARGE INSTRUCTIONS
Follow-up in 1 month with [facility] in cardiology clinic continue aspirin and colchicine in the meantime.

## 2018-11-23 NOTE — DISCHARGE SUMMARY
[facility]    Discharge Summary  Hospitalist    Date of Admission:  11/22/2018  Date of Discharge:  11/23/2018  Discharging Provider: Anne Cee MD    Discharge Diagnoses   Acute pericarditis  Bradycardia and hypotension likely related to vasovagal effect while on atenolol and amlodipine  Hypertension history  Positive Lyme antibody    History of Present Illness   Collette Sanchez is an 87 year old female with history of hypertension who presented with chest pain worsened with deep inspiration and positional changes.  See admission H&P for details    Hospital Course   Collette Sanchez was admitted on 11/22/2018.  The following problems were addressed during her hospitalization:    Pericarditis, left-sided pleuritis:   Patient presented with chest pain which was positional and associated with deep breathing and had significant cardiac and pleural rub on examination.     Some pericardial thickening noted on CT aortogram.  No recent upper respiratory illness.   -Aspirin enteric-coated 650 mg 3 times daily initiated.  TTE with no evidence of pericardial effusion and no acute abnormality  -Troponin remain negative.  Lower extremity ultrasound negative for DVT, CT aortogram negative for PE.  Sed rate and CRP within normal limits  -Cardiology followed patient in house and recommended addition of colchicine and PPI.   With initiation of anti-inflammatories her symptoms resolved and she did not have any further chest pain.  She was discharged with outpatient follow-up plan with cardiology    Bradycardia with hypotension:   Suspect vasovagal related to pain, nausea while on atenolol.  Got a dose of atropine in the ED with improvement of her symptoms/bradycardia  -PTA Atenolol and amlodipine held and patient monitored in cardiac unit.    -Patient remained in sinus rhythm since admission, echo unremarkable, cardiology followed and recommended holding PTA atenolol and amlodipine at the time of  discharge.    (The echocardiogram tech had raised concern about abnormality in upper abdomen while doing echocardiogram.  Discussed with Dr. Kumar from cardiology who reviewed the echocardiogram with me.  Noted to have some nodularity in the liver.  Patient was completely asymptomatic.  CT aortogram done on the day of admission had mentioned about normal liver imaging, discussed with patient and family about the concerns raised by echocardiogram technician.  Recommended outpatient follow-up by PCP with possible upper abdominal ultrasound)    Positive Lyme antibody    Patient does have a history of Lyme disease with treatment of doxycycline approximately 3 years ago.  Does not believe she has had recurrence of Lyme since.  -No history of recent rash.   -Lyme disease antibody came back positive however pending confirmatory test including IgG and IgM by immunoblot    Hypertension: Patient reports taking atenolol 25 mg twice daily and amlodipine 5 mg daily  -Atenolol, amlodipine held at the time of discharge per cardiology recommendation.  Outpatient initiation of ACE inhibitor or alternative beta-blocker per cardiology.    # Discharge Pain Plan:   - Patient currently has NO PAIN and is not being prescribed pain medications on discharge.    Active Problems:    Pericarditis      Anne Cee MD    Significant Results and Procedures   See below    Pending Results   These results will be followed up by PCP  Unresulted Labs Ordered in the Past 30 Days of this Admission     Date and Time Order Name Status Description    11/22/2018 0545 Lyme Conf IgG and IgM by Immunoblot In process           Code Status   Full Code       Primary Care Physician   Carmen Rivas    Physical Exam   Temp: 98.5  F (36.9  C) Temp src: Oral BP: 138/72   Heart Rate: 69 Resp: 18 SpO2: 94 % O2 Device: None (Room air)    Vitals:    11/22/18 0003 11/22/18 0416 11/23/18 0633   Weight: 70.3 kg (155 lb) 70.4 kg (155 lb 3.3 oz) 71.8 kg (158 lb 6.4 oz)      Vital Signs with Ranges  Temp:  [97.9  F (36.6  C)-98.5  F (36.9  C)] 98.5  F (36.9  C)  Heart Rate:  [55-82] 69  Resp:  [13-27] 18  BP: (108-147)/(45-99) 138/72  SpO2:  [94 %-98 %] 94 %  I/O last 3 completed shifts:  In: 1625 [P.O.:200; I.V.:1425]  Out: -     Exam:  Constitutional: Awake, alert and no distress. Appears comfortable  Head: Normocephalic. No masses, lesions, tenderness or abnormalities  ENT: ENT exam normal, no neck nodes or sinus tenderness  Cardiovascular: RRR.3+ murmurs, pericardial rub present in precordium however decreased as compared to yesterday  Respiratory: Normal WOB,b/l equal air entry, no wheezes or crackles   Gastrointestinal: Abdomen soft, non-tender. BS normal. No masses, organomegaly  : Deferred   extremities : Minimal bilateral edema , no clubbing or cyanosis      Discharge Disposition   Discharged to home  Condition at discharge: Stable    Consultations This Hospital Stay   CARDIOLOGY IP CONSULT  PHYSICAL THERAPY ADULT IP CONSULT    Time Spent on this Encounter   IAnne, personally saw the patient today and spent greater than 30 minutes discharging this patient.    Discharge Orders     Follow-Up with Cardiac Advanced Practice Provider     Reason for your hospital stay   Acute pericarditis     Activity   Your activity upon discharge: activity as tolerated     Monitor and record   blood pressure daily and readings to PCP/Cardiology for any medication adjustment   pulse daily     Follow-up and recommended labs and tests    Follow up with primary care provider, Carmen Rivas, within 7 days for hospital follow- up.  The following labs/tests are recommended: CBC/BMP.  Abdominal ultrasound through PCP to evaluate for  Questionable nodularity in the liver   PCP to Follow-up on lyme confirmatory serology  Follow-up with [facility] as scheduled     Full Code   As per discussion by admitting provider     Diet   Follow this diet upon discharge: Orders Placed This Encounter      Room Service     Regular Diet Adult       Discharge Medications   Current Discharge Medication List      START taking these medications    Details   aspirin (ASA) 325 MG EC tablet Take 2 tablets (650 mg) by mouth 3 times daily  Qty: 180 tablet, Refills: 0    Comments: Future refills by PCP Dr. Carmen Rivas with phone number [phone].  Associated Diagnoses: Acute idiopathic pericarditis      colchicine (COLCYRS) 0.6 MG tablet Take 1 tablet (0.6 mg) by mouth daily  Qty: 30 tablet, Refills: 0    Comments: Future refills by PCP Dr. Carmen Rivas with phone number [phone].  Associated Diagnoses: Acute idiopathic pericarditis      omeprazole (PRILOSEC) 40 MG capsule Take 1 capsule (40 mg) by mouth every morning (before breakfast)  Qty: 30 capsule, Refills: 0    Comments: Future refills by PCP Dr. Carmen Rivas with phone number [phone].  Associated Diagnoses: Acute idiopathic pericarditis         CONTINUE these medications which have NOT CHANGED    Details   acetaminophen (TYLENOL) 325 MG tablet Take 650 mg by mouth every 4 hours as needed for mild pain      calcium carbonate 600 mg, elemental, (OS-BRIANA) 1500 (600 Ca) MG tablet Take 1,200 mg by mouth daily      oxybutynin (DITROPAN) 5 MG tablet Take 5 mg by mouth 2 times daily as needed for bladder spasms      Vitamin D, Cholecalciferol, 400 units TABS Take 1 tablet by mouth daily         STOP taking these medications       amLODIPine (NORVASC) 5 MG tablet Comments:   Reason for Stopping:         atenolol (TENORMIN) 25 MG tablet Comments:   Reason for Stopping:             Allergies   Allergies   Allergen Reactions     Cephalosporins      Data   Most Recent 3 CBC's:  Recent Labs   Lab Test  11/23/18   0505  11/22/18   0020   WBC  7.1  9.2   HGB  11.5*  13.4   MCV  89  89   PLT  172  218      Most Recent 3 BMP's:  Recent Labs   Lab Test  11/23/18   0505  11/22/18   0020   NA  141  137   POTASSIUM  4.4  4.0   CHLORIDE  111*  102   CO2  26  28   BUN  14  20   CR   0.65  1.02   ANIONGAP  4  7   BRIANA  8.2*  8.7   GLC  93  107*     Most Recent 2 LFT's:  Recent Labs   Lab Test  11/23/18   0505   AST  56*   ALT  55*   ALKPHOS  59   BILITOTAL  1.1     Most Recent INR's and Anticoagulation Dosing History:  Anticoagulation Dose History     There is no flowsheet data to display.        Most Recent 3 Troponin's:  Recent Labs   Lab Test  11/22/18   1305  11/22/18   0837  11/22/18   0545   TROPI  <0.015  <0.015  <0.015     Most Recent Cholesterol Panel:No lab results found.  Most Recent 6 Bacteria Isolates From Any Culture (See EPIC Reports for Culture Details):No lab results found.  Most Recent TSH, T4 and A1c Labs:No lab results found.  Results for orders placed or performed during the hospital encounter of 11/22/18   CT Aortic Survey w Contrast    Narrative    CT AORTIC SURVEY W CONTRAST  11/22/2018 1:40 AM     HISTORY: Chest pain/neck pain.    TECHNIQUE: Volumetric acquisition through chest, abdomen and pelvis  with IV contrast. Additional precontrast images through the chest. 80  mL Isovue-370. Radiation dose for this scan was reduced using  automated exposure control, adjustment of the mA and/or kV according  to patient size, or iterative reconstruction technique.    COMPARISON: None.    FINDINGS: Precontrast images demonstrate some atheromatous  calcification of the thoracic aorta and coronary artery  calcifications. Following contrast there is mild atheromatous change  of the thoracic aorta without aneurysm or dissection. Trace  pericardial thickening or fluid. No pathologic lymph node enlargement  in the chest. Mild dependent atelectasis. No consolidative  infiltrates, pleural effusions or pneumothorax.    Abdomen and pelvis: Mildly atheromatous abdominal aorta without  aneurysm or dissection. Celiac axis, SMA, and renal arteries are  patent. Mild narrowing of the proximal celiac artery. The liver,  gallbladder, spleen, pancreas, adrenal glands and kidneys demonstrate  no  worrisome findings. Left renal cyst. No hydronephrosis.    The uterus is present. No suspicious pelvic masses. Diffuse colonic  diverticulosis without convincing diverticulitis. No bowel obstruction  or ascites. Degenerative changes throughout the visualized spine with  multilevel degenerative disc disease in the lumbar spine.      Impression    IMPRESSION:  1. Mildly atheromatous aorta without aneurysm or dissection.  2. No other acute findings.  3. Trace pericardial thickening or fluid.  4. Colonic diverticulosis.    KARMA ZIEGLER MD   US Lower Extremity Venous Duplex Bilateral    Narrative    ULTRASOUND LOWER EXTREMITY VENOUS DUPLEX BILATERAL   11/22/2018 7:27  AM     HISTORY: Rule out deep venous thrombosis, lower extremity swelling  (historical), hypotension/bradycardia episode.     COMPARISON: None.    FINDINGS: Gray-scale, color and Doppler spectral analysis ultrasound  was performed of the bilateral legs. Compression and augmentation  imaging was performed.    There is no evidence for deep venous thrombosis.      Impression    IMPRESSION: No evidence for DVT within either leg.    ELEANOR CHAVEZ MD

## 2018-11-23 NOTE — CONSULTS
[facility]    Cardiology Consultation     Date of Admission:  11/22/2018    Assessment & Plan   Collette Sanchez is a 87 year old female with history of Lyme disease who presents with severe pleuritic chest pain. Cardiology has been consulted for recommendations of acute pericarditis.  Troponins negative, CRP within normal limits, no evidence of pericardial effusion on imaging. Unclear etiology of pericarditis. No signs of recent viral infection, patient is routinely exposed to ticks and has history of Lyme disease.    1.  Acute pericarditis.   - Agree with high-dose aspirin for 2 weeks, recommend taper over 1-2 weeks after symptom relief  - Will add colchicine 0.6 mg daily to be continued for 3 months (reduced dose with eGFR 35)  - Will add GI protectant, omeprazole 20 mg daily  - TTE shows normal LV systolic function, with no pericardial effusion  - Lyme serology pending    2.  Bradycardia on admission.  Suspect vasovagal episode in the setting of severe pain and atenolol  3.  History of hypertension.  Hold atenolol and amlodipine, BP stable     Elli Amaya MD  Text Page      Code Status    Full Code    Reason for Consult   Reason for consult: Acute pericarditis    Primary Care Physician   Carmen Rivas    Chief Complaint   Chest pain  History of Present Illness   Collette Sanchez is a 87 year old female with history of hypertension, breast cancer, Lyme disease who presents with severe chest pain.  Patient was in her usual state of health, until the day prior to admission. She is extremely active, lives independently, without cardiac limitation.  Only medications are atenolol and amlodipine.  Patient visiting family from [place].    Yesterday night she developed severe chest pain that woke her from sleep.  She reported central chest pain, positional in nature, significantly worse with lying flat. Pain relieved with sitting forward, exacerbated by deep inspiration.  No history of similar  symptoms.  She does have a history of Lyme disease which was appropriately treated.  She gardens frequently and reports consistent exposure to ticks, however she is diligent about removing the ticks before they bite.  She otherwise denies any recent upper respiratory infection.  No recent sick contacts.  No prior history of pericarditis.  She has no known history of myocardial infarction or CVA.  Pain is improved on high-dose aspirin. Troponins negative x3 sets.  CT chest shows slightly thickened pericardium with no evidence for effusion.  Hemodynamically stable.    Past Medical History   I have reviewed this patient's medical history and updated it with pertinent information if needed.   History reviewed. No pertinent past medical history.    Past Surgical History   I have reviewed this patient's surgical history and updated it with pertinent information if needed.  History reviewed. No pertinent surgical history.    Prior to Admission Medications   Prior to Admission Medications   Prescriptions Last Dose Informant Patient Reported? Taking?   Vitamin D, Cholecalciferol, 400 units TABS 11/21/2018 at Unknown time Self Yes Yes   Sig: Take 1 tablet by mouth daily   acetaminophen (TYLENOL) 325 MG tablet prn at prn Self Yes Yes   Sig: Take 650 mg by mouth every 4 hours as needed for mild pain   amLODIPine (NORVASC) 5 MG tablet 11/21/2018 at Unknown time Self Yes Yes   Sig: Take 5 mg by mouth daily   atenolol (TENORMIN) 25 MG tablet 11/21/2018 at Unknown time Self Yes Yes   Sig: Take 25 mg by mouth 2 times daily   calcium carbonate 600 mg, elemental, (OS-BRIANA) 1500 (600 Ca) MG tablet 11/22/2018 at Unknown time Self Yes Yes   Sig: Take 1,200 mg by mouth daily   oxybutynin (DITROPAN) 5 MG tablet prn at prn Self Yes Yes   Sig: Take 5 mg by mouth 2 times daily as needed for bladder spasms      Facility-Administered Medications: None     Allergies   Allergies   Allergen Reactions     Cephalosporins        Social History   I  have reviewed this patient's social history and updated it with pertinent information if needed. Collette Sanchez  reports that she has never smoked. She has never used smokeless tobacco. She reports that she does not drink alcohol or use illicit drugs.    Family History   I have reviewed this patient's family history and updated it with pertinent information if needed.     Review of Systems   The 10 point Review of Systems is negative other than noted in the HPI or here.     Physical Exam   Temp: 98.1  F (36.7  C) Temp src: Axillary BP: 144/65 Pulse: 75 Heart Rate: 73 Resp: 22 SpO2: 94 % O2 Device: None (Room air)    Vital Signs with Ranges  Temp:  [97.7  F (36.5  C)-98.4  F (36.9  C)] 98.1  F (36.7  C)  Pulse:  [75] 75  Heart Rate:  [33-91] 73  Resp:  [12-29] 22  BP: ()/(37-99) 144/65  SpO2:  [90 %-99 %] 94 %  155 lbs 3.26 oz    Constitutional: alert, oriented, no distress, lying 45 degrees supine in hospital bed  Eyes: anicteric sclerae, no conjunctivitis   Respiratory: normal respiratory effort on room air, occasionally takes short shallow breaths, clear to auscultation bilaterally, no wheezing, no crackles noted.  Cardiovascular: regular rate and rhythm, normal S1 and S2, pericardial rub appreciated across the precordium, no other murmurs or third heart sounds appreciated, normal JVP  GI: soft, non-tender, non-distended, normal bowel sounds throughout  Extremities: No peripheral edema noted  Skin: no rashes or breakdown of examined skin areas  Neurologic: no focal deficits, grossly intact  Neuropsychiatric: normal affect, and mood. Answers questions appropriately, interactive on exam    Data   CRP 5, troponin negative x3, normal CBC, normal renal function.  ECG shows sinus rhythm with no ST elevation or significant MT depression.

## 2018-11-25 LAB
B BURGDOR IGG SER QL IB: NEGATIVE
B BURGDOR IGG+IGM SER QL: 4.01 (ref 0–0.89)
B BURGDOR IGM SER QL IB: NEGATIVE

## 2018-12-03 ENCOUNTER — TELEPHONE (OUTPATIENT)
Dept: CARDIOLOGY | Facility: CLINIC | Age: 83
End: 2018-12-03

## 2018-12-03 NOTE — TELEPHONE ENCOUNTER
Patient called inquiring if she needed to continue all of the medications she discharged from the hospital with. RN reviewed with patient her medications and confirmed that she does need to continue taking them. Patient advised she lives in [place] and was in [place] for Thanksgiving and plans to have all of her future medical treatment in [place]. Patient had not concerns regarding symptoms, but was curious about stopping some of the medications she was started on. RN advised patient to continue medications as rx until she follows up with her PMD and cardiology team in [place] for further direction. Patient verbalized understanding and agreed with plan.

## 2020-03-11 ENCOUNTER — HEALTH MAINTENANCE LETTER (OUTPATIENT)
Age: 85
End: 2020-03-11

## 2021-01-03 ENCOUNTER — HEALTH MAINTENANCE LETTER (OUTPATIENT)
Age: 86
End: 2021-01-03

## 2021-04-25 ENCOUNTER — HEALTH MAINTENANCE LETTER (OUTPATIENT)
Age: 86
End: 2021-04-25

## 2021-10-10 ENCOUNTER — HEALTH MAINTENANCE LETTER (OUTPATIENT)
Age: 86
End: 2021-10-10

## 2022-05-21 ENCOUNTER — HEALTH MAINTENANCE LETTER (OUTPATIENT)
Age: 87
End: 2022-05-21

## 2022-09-18 ENCOUNTER — HEALTH MAINTENANCE LETTER (OUTPATIENT)
Age: 87
End: 2022-09-18

## 2023-06-04 ENCOUNTER — HEALTH MAINTENANCE LETTER (OUTPATIENT)
Age: 88
End: 2023-06-04